# Patient Record
Sex: MALE | Race: WHITE | Employment: OTHER | URBAN - METROPOLITAN AREA
[De-identification: names, ages, dates, MRNs, and addresses within clinical notes are randomized per-mention and may not be internally consistent; named-entity substitution may affect disease eponyms.]

---

## 2021-02-01 ENCOUNTER — APPOINTMENT (OUTPATIENT)
Dept: GENERAL RADIOLOGY | Age: 76
DRG: 177 | End: 2021-02-01
Attending: EMERGENCY MEDICINE
Payer: MEDICARE

## 2021-02-01 ENCOUNTER — HOSPITAL ENCOUNTER (INPATIENT)
Age: 76
LOS: 6 days | Discharge: HOME | DRG: 177 | End: 2021-02-07
Attending: STUDENT IN AN ORGANIZED HEALTH CARE EDUCATION/TRAINING PROGRAM | Admitting: FAMILY MEDICINE
Payer: MEDICARE

## 2021-02-01 DIAGNOSIS — U07.1 COVID-19 VIRUS INFECTION: Primary | ICD-10-CM

## 2021-02-01 DIAGNOSIS — R09.02 HYPOXIA: ICD-10-CM

## 2021-02-01 PROBLEM — J96.01 ACUTE RESPIRATORY FAILURE WITH HYPOXIA (HCC): Status: ACTIVE | Noted: 2021-02-01

## 2021-02-01 PROBLEM — J18.9 PNEUMONIA: Status: ACTIVE | Noted: 2021-02-01

## 2021-02-01 LAB
ALBUMIN SERPL-MCNC: 3 G/DL (ref 3.5–5)
ALBUMIN/GLOB SERPL: 0.6 {RATIO} (ref 1.1–2.2)
ALP SERPL-CCNC: 47 U/L (ref 45–117)
ALT SERPL-CCNC: 52 U/L (ref 12–78)
ANION GAP SERPL CALC-SCNC: 8 MMOL/L (ref 5–15)
AST SERPL W P-5'-P-CCNC: 57 U/L (ref 15–37)
BASOPHILS # BLD: 0 K/UL (ref 0–0.1)
BASOPHILS NFR BLD: 0 % (ref 0–1)
BILIRUB SERPL-MCNC: 0.4 MG/DL (ref 0.2–1)
BUN SERPL-MCNC: 19 MG/DL (ref 6–20)
BUN/CREAT SERPL: 17 (ref 12–20)
CA-I BLD-MCNC: 8.9 MG/DL (ref 8.5–10.1)
CHLORIDE SERPL-SCNC: 99 MMOL/L (ref 97–108)
CO2 SERPL-SCNC: 26 MMOL/L (ref 21–32)
CREAT SERPL-MCNC: 1.14 MG/DL (ref 0.7–1.3)
DIFFERENTIAL METHOD BLD: ABNORMAL
EOSINOPHIL # BLD: 0 K/UL (ref 0–0.4)
EOSINOPHIL NFR BLD: 0 % (ref 0–7)
ERYTHROCYTE [DISTWIDTH] IN BLOOD BY AUTOMATED COUNT: 13.8 % (ref 11.5–14.5)
GLOBULIN SER CALC-MCNC: 4.7 G/DL (ref 2–4)
GLUCOSE SERPL-MCNC: 109 MG/DL (ref 65–100)
HCT VFR BLD AUTO: 46.3 % (ref 36.6–50.3)
HGB BLD-MCNC: 15.6 G/DL (ref 12.1–17)
IMM GRANULOCYTES # BLD AUTO: 0 K/UL (ref 0–0.04)
IMM GRANULOCYTES NFR BLD AUTO: 1 % (ref 0–0.5)
LACTATE SERPL-SCNC: 1.4 MMOL/L (ref 0.4–2)
LYMPHOCYTES # BLD: 1.2 K/UL (ref 0.8–3.5)
LYMPHOCYTES NFR BLD: 20 % (ref 12–49)
MCH RBC QN AUTO: 30.8 PG (ref 26–34)
MCHC RBC AUTO-ENTMCNC: 33.7 G/DL (ref 30–36.5)
MCV RBC AUTO: 91.5 FL (ref 80–99)
MONOCYTES # BLD: 0.6 K/UL (ref 0–1)
MONOCYTES NFR BLD: 10 % (ref 5–13)
NEUTS SEG # BLD: 4.5 K/UL (ref 1.8–8)
NEUTS SEG NFR BLD: 69 % (ref 32–75)
NRBC # BLD: 0 K/UL (ref 0–0.01)
NRBC BLD-RTO: 0 PER 100 WBC
PLATELET # BLD AUTO: 252 K/UL (ref 150–400)
PMV BLD AUTO: 9.2 FL (ref 8.9–12.9)
POTASSIUM SERPL-SCNC: 4.1 MMOL/L (ref 3.5–5.1)
PROT SERPL-MCNC: 7.7 G/DL (ref 6.4–8.2)
RBC # BLD AUTO: 5.06 M/UL (ref 4.1–5.7)
SODIUM SERPL-SCNC: 133 MMOL/L (ref 136–145)
WBC # BLD AUTO: 6.3 K/UL (ref 4.1–11.1)

## 2021-02-01 PROCEDURE — 99285 EMERGENCY DEPT VISIT HI MDM: CPT

## 2021-02-01 PROCEDURE — 85025 COMPLETE CBC W/AUTO DIFF WBC: CPT

## 2021-02-01 PROCEDURE — 80053 COMPREHEN METABOLIC PANEL: CPT

## 2021-02-01 PROCEDURE — 83605 ASSAY OF LACTIC ACID: CPT

## 2021-02-01 PROCEDURE — 71045 X-RAY EXAM CHEST 1 VIEW: CPT

## 2021-02-01 PROCEDURE — 36415 COLL VENOUS BLD VENIPUNCTURE: CPT

## 2021-02-01 PROCEDURE — 87040 BLOOD CULTURE FOR BACTERIA: CPT

## 2021-02-01 PROCEDURE — 93005 ELECTROCARDIOGRAM TRACING: CPT

## 2021-02-01 PROCEDURE — 65660000001 HC RM ICU INTERMED STEPDOWN

## 2021-02-01 RX ORDER — DEXAMETHASONE SODIUM PHOSPHATE 4 MG/ML
6 INJECTION, SOLUTION INTRA-ARTICULAR; INTRALESIONAL; INTRAMUSCULAR; INTRAVENOUS; SOFT TISSUE EVERY 8 HOURS
Status: DISCONTINUED | OUTPATIENT
Start: 2021-02-01 | End: 2021-02-03

## 2021-02-01 RX ORDER — POLYETHYLENE GLYCOL 3350 17 G/17G
17 POWDER, FOR SOLUTION ORAL DAILY PRN
Status: DISCONTINUED | OUTPATIENT
Start: 2021-02-01 | End: 2021-02-07 | Stop reason: HOSPADM

## 2021-02-01 RX ORDER — ENOXAPARIN SODIUM 100 MG/ML
40 INJECTION SUBCUTANEOUS DAILY
Status: DISCONTINUED | OUTPATIENT
Start: 2021-02-02 | End: 2021-02-03

## 2021-02-01 RX ORDER — PROMETHAZINE HYDROCHLORIDE 25 MG/1
12.5 TABLET ORAL
Status: DISCONTINUED | OUTPATIENT
Start: 2021-02-01 | End: 2021-02-07 | Stop reason: HOSPADM

## 2021-02-01 RX ORDER — ACETAMINOPHEN 650 MG/1
650 SUPPOSITORY RECTAL
Status: DISCONTINUED | OUTPATIENT
Start: 2021-02-01 | End: 2021-02-07 | Stop reason: HOSPADM

## 2021-02-01 RX ORDER — SODIUM CHLORIDE 0.9 % (FLUSH) 0.9 %
5-40 SYRINGE (ML) INJECTION EVERY 8 HOURS
Status: DISCONTINUED | OUTPATIENT
Start: 2021-02-01 | End: 2021-02-07 | Stop reason: HOSPADM

## 2021-02-01 RX ORDER — ACETAMINOPHEN 325 MG/1
650 TABLET ORAL
Status: DISCONTINUED | OUTPATIENT
Start: 2021-02-01 | End: 2021-02-07 | Stop reason: HOSPADM

## 2021-02-01 RX ORDER — SODIUM CHLORIDE 0.9 % (FLUSH) 0.9 %
5-40 SYRINGE (ML) INJECTION AS NEEDED
Status: DISCONTINUED | OUTPATIENT
Start: 2021-02-01 | End: 2021-02-07 | Stop reason: HOSPADM

## 2021-02-01 RX ORDER — ONDANSETRON 2 MG/ML
4 INJECTION INTRAMUSCULAR; INTRAVENOUS
Status: DISCONTINUED | OUTPATIENT
Start: 2021-02-01 | End: 2021-02-07 | Stop reason: HOSPADM

## 2021-02-01 NOTE — ED TRIAGE NOTES
[facility] pt tested COVID + a week ago and has been having increasing SOB/respiratory distress and confusion when his O2 saturation drops; upon EMS arrival pt's O2 sats were 74% on RA with 4l/min NC pt's sats peaked at 92%

## 2021-02-02 LAB
ANION GAP SERPL CALC-SCNC: 8 MMOL/L (ref 5–15)
ATRIAL RATE: 97 BPM
BACTERIA SPEC CULT: NORMAL
BACTERIA SPEC CULT: NORMAL
BUN SERPL-MCNC: 19 MG/DL (ref 6–20)
BUN/CREAT SERPL: 17 (ref 12–20)
CA-I BLD-MCNC: 8.5 MG/DL (ref 8.5–10.1)
CALCULATED P AXIS, ECG09: 43 DEGREES
CALCULATED R AXIS, ECG10: 25 DEGREES
CALCULATED T AXIS, ECG11: 33 DEGREES
CHLORIDE SERPL-SCNC: 101 MMOL/L (ref 97–108)
CO2 SERPL-SCNC: 25 MMOL/L (ref 21–32)
COVID-19 RAPID TEST, COVR: DETECTED
CREAT SERPL-MCNC: 1.1 MG/DL (ref 0.7–1.3)
CRP SERPL-MCNC: 10.5 MG/DL (ref 0–0.6)
DIAGNOSIS, 93000: NORMAL
ERYTHROCYTE [DISTWIDTH] IN BLOOD BY AUTOMATED COUNT: 13.8 % (ref 11.5–14.5)
FERRITIN SERPL-MCNC: 735 NG/ML (ref 26–388)
GLUCOSE SERPL-MCNC: 139 MG/DL (ref 65–100)
HCT VFR BLD AUTO: 42.8 % (ref 36.6–50.3)
HGB BLD-MCNC: 14.4 G/DL (ref 12.1–17)
LDH SERPL L TO P-CCNC: 434 U/L (ref 85–241)
MAGNESIUM SERPL-MCNC: 2 MG/DL (ref 1.6–2.4)
MCH RBC QN AUTO: 31 PG (ref 26–34)
MCHC RBC AUTO-ENTMCNC: 33.6 G/DL (ref 30–36.5)
MCV RBC AUTO: 92 FL (ref 80–99)
P-R INTERVAL, ECG05: 158 MS
PLATELET # BLD AUTO: 259 K/UL (ref 150–400)
PMV BLD AUTO: 9.7 FL (ref 8.9–12.9)
POTASSIUM SERPL-SCNC: 4.2 MMOL/L (ref 3.5–5.1)
PROCALCITONIN SERPL-MCNC: <0.05 NG/ML
Q-T INTERVAL, ECG07: 356 MS
QRS DURATION, ECG06: 90 MS
QTC CALCULATION (BEZET), ECG08: 452 MS
RBC # BLD AUTO: 4.65 M/UL (ref 4.1–5.7)
SARS-COV-2, COV2: NORMAL
SODIUM SERPL-SCNC: 134 MMOL/L (ref 136–145)
SPECIAL REQUESTS,SREQ: NORMAL
SPECIMEN SOURCE: ABNORMAL
VENTRICULAR RATE, ECG03: 97 BPM
WBC # BLD AUTO: 7 K/UL (ref 4.1–11.1)

## 2021-02-02 PROCEDURE — 85027 COMPLETE CBC AUTOMATED: CPT

## 2021-02-02 PROCEDURE — 83615 LACTATE (LD) (LDH) ENZYME: CPT

## 2021-02-02 PROCEDURE — 65660000001 HC RM ICU INTERMED STEPDOWN

## 2021-02-02 PROCEDURE — 74011250636 HC RX REV CODE- 250/636: Performed by: FAMILY MEDICINE

## 2021-02-02 PROCEDURE — 74011000258 HC RX REV CODE- 258: Performed by: FAMILY MEDICINE

## 2021-02-02 PROCEDURE — 83735 ASSAY OF MAGNESIUM: CPT

## 2021-02-02 PROCEDURE — XW033E5 INTRODUCTION OF REMDESIVIR ANTI-INFECTIVE INTO PERIPHERAL VEIN, PERCUTANEOUS APPROACH, NEW TECHNOLOGY GROUP 5: ICD-10-PCS | Performed by: HOSPITALIST

## 2021-02-02 PROCEDURE — 82728 ASSAY OF FERRITIN: CPT

## 2021-02-02 PROCEDURE — 86140 C-REACTIVE PROTEIN: CPT

## 2021-02-02 PROCEDURE — 74011000250 HC RX REV CODE- 250: Performed by: FAMILY MEDICINE

## 2021-02-02 PROCEDURE — 36415 COLL VENOUS BLD VENIPUNCTURE: CPT

## 2021-02-02 PROCEDURE — 84145 PROCALCITONIN (PCT): CPT

## 2021-02-02 PROCEDURE — 87635 SARS-COV-2 COVID-19 AMP PRB: CPT

## 2021-02-02 PROCEDURE — 80048 BASIC METABOLIC PNL TOTAL CA: CPT

## 2021-02-02 RX ORDER — ASCORBIC ACID 500 MG
1000 TABLET ORAL DAILY
Status: DISCONTINUED | OUTPATIENT
Start: 2021-02-03 | End: 2021-02-07 | Stop reason: HOSPADM

## 2021-02-02 RX ORDER — METOPROLOL TARTRATE 25 MG/1
25 TABLET, FILM COATED ORAL 2 TIMES DAILY
COMMUNITY

## 2021-02-02 RX ORDER — IRBESARTAN 300 MG/1
300 TABLET ORAL DAILY
COMMUNITY

## 2021-02-02 RX ORDER — ACYCLOVIR 400 MG/1
400 TABLET ORAL 2 TIMES DAILY
COMMUNITY
End: 2021-02-07

## 2021-02-02 RX ORDER — ZINC SULFATE 50(220)MG
1 CAPSULE ORAL DAILY
Status: DISCONTINUED | OUTPATIENT
Start: 2021-02-03 | End: 2021-02-07 | Stop reason: HOSPADM

## 2021-02-02 RX ORDER — AMLODIPINE BESYLATE 5 MG/1
5 TABLET ORAL DAILY
COMMUNITY

## 2021-02-02 RX ORDER — GUAIFENESIN 100 MG/5ML
81 LIQUID (ML) ORAL DAILY
COMMUNITY

## 2021-02-02 RX ORDER — MELATONIN
2000 DAILY
Status: DISCONTINUED | OUTPATIENT
Start: 2021-02-03 | End: 2021-02-07 | Stop reason: HOSPADM

## 2021-02-02 RX ORDER — GUAIFENESIN 100 MG/5ML
81 LIQUID (ML) ORAL DAILY
Status: DISCONTINUED | OUTPATIENT
Start: 2021-02-03 | End: 2021-02-07 | Stop reason: HOSPADM

## 2021-02-02 RX ADMIN — DEXAMETHASONE SODIUM PHOSPHATE 6 MG: 4 INJECTION, SOLUTION INTRA-ARTICULAR; INTRALESIONAL; INTRAMUSCULAR; INTRAVENOUS; SOFT TISSUE at 06:41

## 2021-02-02 RX ADMIN — Medication 10 ML: at 17:26

## 2021-02-02 RX ADMIN — Medication 10 ML: at 06:01

## 2021-02-02 RX ADMIN — REMDESIVIR 200 MG: 100 INJECTION, POWDER, LYOPHILIZED, FOR SOLUTION INTRAVENOUS at 00:59

## 2021-02-02 RX ADMIN — Medication 10 ML: at 00:02

## 2021-02-02 RX ADMIN — ENOXAPARIN SODIUM 40 MG: 40 INJECTION SUBCUTANEOUS at 09:00

## 2021-02-02 RX ADMIN — DEXAMETHASONE SODIUM PHOSPHATE 6 MG: 4 INJECTION, SOLUTION INTRA-ARTICULAR; INTRALESIONAL; INTRAMUSCULAR; INTRAVENOUS; SOFT TISSUE at 00:03

## 2021-02-02 RX ADMIN — AZITHROMYCIN DIHYDRATE 500 MG: 500 INJECTION, POWDER, LYOPHILIZED, FOR SOLUTION INTRAVENOUS at 22:56

## 2021-02-02 RX ADMIN — AZITHROMYCIN DIHYDRATE 500 MG: 500 INJECTION, POWDER, LYOPHILIZED, FOR SOLUTION INTRAVENOUS at 00:04

## 2021-02-02 RX ADMIN — DEXAMETHASONE SODIUM PHOSPHATE 6 MG: 4 INJECTION, SOLUTION INTRA-ARTICULAR; INTRALESIONAL; INTRAMUSCULAR; INTRAVENOUS; SOFT TISSUE at 17:26

## 2021-02-02 RX ADMIN — REMDESIVIR 100 MG: 100 INJECTION, POWDER, LYOPHILIZED, FOR SOLUTION INTRAVENOUS at 21:52

## 2021-02-02 RX ADMIN — DEXAMETHASONE SODIUM PHOSPHATE 6 MG: 4 INJECTION, SOLUTION INTRA-ARTICULAR; INTRALESIONAL; INTRAMUSCULAR; INTRAVENOUS; SOFT TISSUE at 21:46

## 2021-02-02 NOTE — PROGRESS NOTES
Hospitalist Progress Note               Daily Progress Note: 2/2/2021      Subjective: The patient is seen for follow up. He is a 73-year-old male with a history of hypertension and CAD who was admitted last night with shortness of breath, cough, confusion and fatigue. He was found to be positive for COVID-19 3 days ago. He denies fever. On arrival of EMS he had oxygen saturations of 74% on room air. Chest x-ray shows multi focal patchy airspace disease.   Labs showed a CRP of 10.5, negative procalcitonin,  normal WBC    Remdesivir has been started along with Decadron and azithromycin    Patient is presently on a 100% nonrebreather but feels like it is suffocating him    Problem List:  Problem List as of 2/2/2021 Date Reviewed: 2/1/2021          Codes Class Noted - Resolved    Pneumonia ICD-10-CM: J18.9  ICD-9-CM: 486  2/1/2021 - Present        Acute respiratory failure with hypoxia [facility]) ICD-10-CM: J96.01  ICD-9-CM: 518.81  2/1/2021 - Present        COVID-19 ICD-10-CM: U07.1  ICD-9-CM: 079.89  2/1/2021 - Present              Medications reviewed  Current Facility-Administered Medications   Medication Dose Route Frequency    azithromycin (ZITHROMAX) 500 mg in 0.9% sodium chloride 250 mL (VIAL-MATE)  500 mg IntraVENous Q24H    dexamethasone (DECADRON) 4 mg/mL injection 6 mg  6 mg IntraVENous Q8H    sodium chloride (NS) flush 5-40 mL  5-40 mL IntraVENous Q8H    sodium chloride (NS) flush 5-40 mL  5-40 mL IntraVENous PRN    acetaminophen (TYLENOL) tablet 650 mg  650 mg Oral Q6H PRN    Or    acetaminophen (TYLENOL) suppository 650 mg  650 mg Rectal Q6H PRN    polyethylene glycol (MIRALAX) packet 17 g  17 g Oral DAILY PRN    promethazine (PHENERGAN) tablet 12.5 mg  12.5 mg Oral Q6H PRN    Or    ondansetron (ZOFRAN) injection 4 mg  4 mg IntraVENous Q6H PRN    enoxaparin (LOVENOX) injection 40 mg  40 mg SubCUTAneous DAILY    remdesivir 100 mg in 0.9% sodium chloride 250 mL IVPB  100 mg IntraVENous Q24H     Current Outpatient Medications   Medication Sig    irbesartan (AVAPRO) 300 mg tablet Take 300 mg by mouth daily.  acyclovir (ZOVIRAX) 400 mg tablet Take 400 mg by mouth two (2) times a day.  aspirin 81 mg chewable tablet Take 81 mg by mouth daily.  metoprolol tartrate (LOPRESSOR) 25 mg tablet Take 25 mg by mouth two (2) times a day.  amLODIPine (Norvasc) 5 mg tablet Take 5 mg by mouth daily. Review of Systems:   A comprehensive review of systems was negative except for that written in the HPI. Objective:   Physical Exam:     Visit Vitals  BP 96/64   Pulse 73   Temp 98.7 °F (37.1 °C)   Resp 18   Ht 5' 9\" (1.753 m)   Wt 99.8 kg (220 lb)   SpO2 99%   BMI 32.49 kg/m²    O2 Flow Rate (L/min): 15 l/min O2 Device: Non-rebreather mask    Temp (24hrs), Av.7 °F (37.1 °C), Min:98.7 °F (37.1 °C), Max:98.7 °F (37.1 °C)    No intake/output data recorded. No intake/output data recorded. General:   Awake and alert   Lungs:   Clear to auscultation bilaterally. Chest wall:  No tenderness or deformity. Heart:  Regular rate and rhythm, S1, S2 normal, no murmur, click, rub or gallop. Abdomen:   Soft, non-tender. Bowel sounds normal. No masses,  No organomegaly. Extremities: Extremities normal, atraumatic, no cyanosis or edema. Pulses: 2+ and symmetric all extremities. Skin: Skin color, texture, turgor normal. No rashes or lesions   Neurologic: CNII-XII intact. No gross focal deficits         Data Review:       Recent Days:  Recent Labs     21   WBC 7.0 6.3   HGB 14.4 15.6   HCT 42.8 46.3    252     Recent Labs     21   * 133*   K 4.2 4.1    99   CO2 25 26   * 109*   BUN 19 19   CREA 1.10 1.14   CA 8.5 8.9   MG 2.0  --    ALB  --  3.0*   TBILI  --  0.4   ALT  --  52     No results for input(s): PH, PCO2, PO2, HCO3, FIO2 in the last 72 hours.     24 Hour Results:  Recent Results (from the past 24 hour(s))   CBC WITH AUTOMATED DIFF    Collection Time: 02/01/21  7:50 PM   Result Value Ref Range    WBC 6.3 4.1 - 11.1 K/uL    RBC 5.06 4.10 - 5.70 M/uL    HGB 15.6 12.1 - 17.0 g/dL    HCT 46.3 36.6 - 50.3 %    MCV 91.5 80.0 - 99.0 FL    MCH 30.8 26.0 - 34.0 PG    MCHC 33.7 30.0 - 36.5 g/dL    RDW 13.8 11.5 - 14.5 %    PLATELET 265 561 - 354 K/uL    MPV 9.2 8.9 - 12.9 FL    NRBC 0.0 0  WBC    ABSOLUTE NRBC 0.00 0.00 - 0.01 K/uL    NEUTROPHILS 69 32 - 75 %    LYMPHOCYTES 20 12 - 49 %    MONOCYTES 10 5 - 13 %    EOSINOPHILS 0 0 - 7 %    BASOPHILS 0 0 - 1 %    IMMATURE GRANULOCYTES 1 (H) 0.0 - 0.5 %    ABS. NEUTROPHILS 4.5 1.8 - 8.0 K/UL    ABS. LYMPHOCYTES 1.2 0.8 - 3.5 K/UL    ABS. MONOCYTES 0.6 0.0 - 1.0 K/UL    ABS. EOSINOPHILS 0.0 0.0 - 0.4 K/UL    ABS. BASOPHILS 0.0 0.0 - 0.1 K/UL    ABS. IMM. GRANS. 0.0 0.00 - 0.04 K/UL    DF AUTOMATED     METABOLIC PANEL, COMPREHENSIVE    Collection Time: 02/01/21  7:50 PM   Result Value Ref Range    Sodium 133 (L) 136 - 145 mmol/L    Potassium 4.1 3.5 - 5.1 mmol/L    Chloride 99 97 - 108 mmol/L    CO2 26 21 - 32 mmol/L    Anion gap 8 5 - 15 mmol/L    Glucose 109 (H) 65 - 100 mg/dL    BUN 19 6 - 20 mg/dL    Creatinine 1.14 0.70 - 1.30 mg/dL    BUN/Creatinine ratio 17 12 - 20      GFR est AA >60 >60 ml/min/1.73m2    GFR est non-AA >60 >60 ml/min/1.73m2    Calcium 8.9 8.5 - 10.1 mg/dL    Bilirubin, total 0.4 0.2 - 1.0 mg/dL    AST (SGOT) 57 (H) 15 - 37 U/L    ALT (SGPT) 52 12 - 78 U/L    Alk.  phosphatase 47 45 - 117 U/L    Protein, total 7.7 6.4 - 8.2 g/dL    Albumin 3.0 (L) 3.5 - 5.0 g/dL    Globulin 4.7 (H) 2.0 - 4.0 g/dL    A-G Ratio 0.6 (L) 1.1 - 2.2     LACTIC ACID    Collection Time: 02/01/21  7:50 PM   Result Value Ref Range    Lactic acid 1.4 0.4 - 2.0 mmol/L   CULTURE, BLOOD, PAIRED    Collection Time: 02/01/21  7:50 PM    Specimen: Blood   Result Value Ref Range    Special Requests: No Special Requests      Culture result:        Gram Positive Rods  CALLED TO AND READ BACK BY  ONESIMO MORALES AT [id number] BY JETHRO  Single bottle drawn has been flagged positive by instumentation. Bottle has been sent to [address] [place] [address] Laboratory for determination of possible growth.      EKG, 12 LEAD, INITIAL    Collection Time: 02/01/21  8:10 PM   Result Value Ref Range    Ventricular Rate 97 BPM    Atrial Rate 97 BPM    P-R Interval 158 ms    QRS Duration 90 ms    Q-T Interval 356 ms    QTC Calculation (Bezet) 452 ms    Calculated P Axis 43 degrees    Calculated R Axis 25 degrees    Calculated T Axis 33 degrees    Diagnosis       Normal sinus rhythm  Normal ECG  No previous ECGs available  Confirmed by Alonso MACEDO MD ([id number]) on 2/2/2021 37:83:20 AM     METABOLIC PANEL, BASIC    Collection Time: 02/02/21  3:30 AM   Result Value Ref Range    Sodium 134 (L) 136 - 145 mmol/L    Potassium 4.2 3.5 - 5.1 mmol/L    Chloride 101 97 - 108 mmol/L    CO2 25 21 - 32 mmol/L    Anion gap 8 5 - 15 mmol/L    Glucose 139 (H) 65 - 100 mg/dL    BUN 19 6 - 20 mg/dL    Creatinine 1.10 0.70 - 1.30 mg/dL    BUN/Creatinine ratio 17 12 - 20      GFR est AA >60 >60 ml/min/1.73m2    GFR est non-AA >60 >60 ml/min/1.73m2    Calcium 8.5 8.5 - 10.1 mg/dL   MAGNESIUM    Collection Time: 02/02/21  3:30 AM   Result Value Ref Range    Magnesium 2.0 1.6 - 2.4 mg/dL   CBC W/O DIFF    Collection Time: 02/02/21  3:30 AM   Result Value Ref Range    WBC 7.0 4.1 - 11.1 K/uL    RBC 4.65 4.10 - 5.70 M/uL    HGB 14.4 12.1 - 17.0 g/dL    HCT 42.8 36.6 - 50.3 %    MCV 92.0 80.0 - 99.0 FL    MCH 31.0 26.0 - 34.0 PG    MCHC 33.6 30.0 - 36.5 g/dL    RDW 13.8 11.5 - 14.5 %    PLATELET 139 510 - 975 K/uL    MPV 9.7 8.9 - 12.9 FL   PROCALCITONIN    Collection Time: 02/02/21 11:10 AM   Result Value Ref Range    Procalcitonin <0.05 (H) 0 ng/mL   LD    Collection Time: 02/02/21 11:10 AM   Result Value Ref Range     (H) 85 - 241 U/L   C REACTIVE PROTEIN, QT    Collection Time: 02/02/21 11:10 AM   Result Value Ref Range    C-Reactive protein 10.50 (H) 0.00 - 0.60 mg/dL       XR CHEST SNGL V   Final Result   Findings/impression:      Multifocal patchy interstitial airspace disease. No pleural effusion or   pneumothorax. Cardiomediastinal contours are within normal limits. No acute osseous abnormality identified. Assessment:  COVID-19 pneumonitis    Acute respiratory failure with hypoxia    Hypertension. Blood pressure on low side, antihypertensives on hold    Coronary artery disease      Plan:  Continue remdesivir, Decadron, azithromycin  I added vitamin D, vitamin C and zinc  Recheck labs in a.m. Patient's blood pressure medications are on hold  We will try high flow nasal oxygen    Full CODE STATUS  His son Patricia Lweis is surrogate decision-maker      Care Plan discussed with: Patient/Family    Total time spent with patient: 30 minutes.     Anabella Chatman MD

## 2021-02-02 NOTE — H&P
History and Physical    Patient: Jorge Kaufman MRN: [id number]  SSN: [id number]    YOB: 1945  Age: 77 y.o. Sex: male      Subjective:      Chief Complaint: Shortness of breath    HPI: Jorge Kaufman is a 77 y.o. male with past medical history of hypertension and coronary artery disease presenting to the ER with complaints of shortness of breath, cough, confusion, fatigue and generalized weakness. Shortness of breath and cough have been present for the past 2 weeks. Patient was found to be positive for COVID-19 3 days ago. Mr. Eligio Jorgensen denies recent fever, chills, nausea, vomiting, chest pain, palpitations, diarrhea or abdominal pain. This evening, emergency medical services were called for increased work of breathing, confusion and worsening shortness of breath. On arrival to patient's home, EMS found patient to have oxygen saturations of 74% on room air. Mr. Eligio Jorgensen was placed on a NRB and transported to the ER for further treatment and evaluation. Past medical history, past surgical history, family history, social history and home medication list was reviewed at the time of admission. Patient is a full code. Point of contact is son, Shannan White, who can be reached at [phone]. Past Medical History:   Diagnosis Date    Bladder cancer [facility])     s/p tumor resection    CAD (coronary artery disease)     previous stent placement    HSV infection     Hypertension      Past Surgical History:   Procedure Laterality Date    HX UROLOGICAL      groin surgery after W      Family History   Problem Relation Age of Onset    Hypertension Father     Coronary Artery Disease Other      Social History     Tobacco Use    Smoking status: Former Smoker    Smokeless tobacco: Never Used   Substance Use Topics    Alcohol use: Not Currently      Prior to Admission medications    Medication Sig Start Date End Date Taking?  Authorizing Provider   irbesartan (AVAPRO) 300 mg tablet Take 300 mg by mouth daily. Yes Provider, Historical   acyclovir (ZOVIRAX) 400 mg tablet Take 400 mg by mouth two (2) times a day. Yes Provider, Historical   aspirin 81 mg chewable tablet Take 81 mg by mouth daily. Yes Provider, Historical   metoprolol tartrate (LOPRESSOR) 25 mg tablet Take 25 mg by mouth two (2) times a day. Yes Provider, Historical   amLODIPine (Norvasc) 5 mg tablet Take 5 mg by mouth daily. Yes Provider, Historical        No Known Allergies    Review of Systems:  Constitutional: Positive for generalized weakness, fatigue, weakness, confusion. Denies fevers, chills, unexplained weight loss, night sweats. Head, Eyes, Ears, Nose, Mouth, Throat: Denies nasal congestion, sore throat, rhinorrhea, earache, ringing of the ears, difficulty hearing, facial pain, facial swelling. Respiratory: Positive for shortness of breath, cough. Denies wheezing, sputum production, hemoptysis. Denies use of oxygen at home. Cardiovascular: Denies chest pain, irregular heart beat, racing pulse, lower extremity edema, dizziness, dyspnea on exertion, orthopnea. Gastrointestinal: Denies nausea, vomiting, diarrhea, constipation, abdominal pain, loss of appetite, acid reflux, melena, hematochezia, change in bowel habits. Endocrine: Denies intolerance to heat or cold. Denies polyuria, polydipsia, polyphagia. Denies recent weight changes. Genitourinary: Denies increased urinary frequency, dysuria, hematuria, urinary incontinence, increased urinary frequency. Integument/Breast: Denies rash, itching or new skin lesions. Musculoskeletal: Denies joint swelling, joint pain, myalgias, neck pain, back pain. Neurological: Denies headaches, dizziness, confusion, tremors, numbness/tingling, paresthesias, weakness, problems with balance, loss of consciousness. Hematologic: Denies easy bleeding, easy bruising, lymphadenopathy.   Behavioral/Psychiatric: Denies anxiety, depression, increased irritability, mood swings, delusions, hallucination, SI/HI. Objective:     Vitals:    02/01/21 1823 02/01/21 2019   BP: 109/73 103/73   Pulse: (!) 116 (!) 103   Resp: 23 (!) 34   Temp: 98.7 °F (37.1 °C)    SpO2:  91%   Weight: 99.8 kg (220 lb)    Height: 5' 9\" (1.753 m)         Physical Exam:  General: Alert and Oriented x 3. Appears unwell but not toxic. Cooperative. No acute distress. Nourished and well developed. Head/Eyes: Normocephalic, atraumatic, EOMI, PERRLA   Nose/Mouth: Turbinates within normal limits, No drainage. Mucous membranes are moist.  Throat and Neck: Posterior pharynx without erythema or exudate. No masses, JVD, thyromegaly or lymphadenopathy appreciated. Cervical spine has good range of motion without pain. Lungs: Diffuse bilaterally rhonchi with decreased breath sounds. Symmetric chest rise with respirations. Heart: Regular rate and rhythm. Normal S1/S2. No appreciated murmurs, rubs or gallops. No lower extremity edema. Abdomen: Soft, non-tender, non-distended. Bowel sounds present in all four quadrants. No masses appreciated. Extremities:  Atraumatic. Able to move all extremities symmetrically. No abnormal bony protuberances appreciated. Back: No pain with palpation over spinous processes or paraspinal musculature. No CVA tenderness. Skin: Clean, dry and intact without appreciated lesions. Neurologic: A&Ox3. Cranial nerves 2-12 are grossly intact. Intact sensation and motor strength in all 4 extremities. No focal deficits. Psychiatric: Normal affect, normal thought process, good eye contact.      Recent Results (from the past 24 hour(s))   CBC WITH AUTOMATED DIFF    Collection Time: 02/01/21  7:50 PM   Result Value Ref Range    WBC 6.3 4.1 - 11.1 K/uL    RBC 5.06 4.10 - 5.70 M/uL    HGB 15.6 12.1 - 17.0 g/dL    HCT 46.3 36.6 - 50.3 %    MCV 91.5 80.0 - 99.0 FL    MCH 30.8 26.0 - 34.0 PG    MCHC 33.7 30.0 - 36.5 g/dL    RDW 13.8 11.5 - 14.5 %    PLATELET 016 591 - 438 K/uL    MPV 9.2 8.9 - 12.9 FL    NRBC 0.0 0  WBC ABSOLUTE NRBC 0.00 0.00 - 0.01 K/uL    NEUTROPHILS 69 32 - 75 %    LYMPHOCYTES 20 12 - 49 %    MONOCYTES 10 5 - 13 %    EOSINOPHILS 0 0 - 7 %    BASOPHILS 0 0 - 1 %    IMMATURE GRANULOCYTES 1 (H) 0.0 - 0.5 %    ABS. NEUTROPHILS 4.5 1.8 - 8.0 K/UL    ABS. LYMPHOCYTES 1.2 0.8 - 3.5 K/UL    ABS. MONOCYTES 0.6 0.0 - 1.0 K/UL    ABS. EOSINOPHILS 0.0 0.0 - 0.4 K/UL    ABS. BASOPHILS 0.0 0.0 - 0.1 K/UL    ABS. IMM. GRANS. 0.0 0.00 - 0.04 K/UL    DF AUTOMATED     METABOLIC PANEL, COMPREHENSIVE    Collection Time: 02/01/21  7:50 PM   Result Value Ref Range    Sodium 133 (L) 136 - 145 mmol/L    Potassium 4.1 3.5 - 5.1 mmol/L    Chloride 99 97 - 108 mmol/L    CO2 26 21 - 32 mmol/L    Anion gap 8 5 - 15 mmol/L    Glucose 109 (H) 65 - 100 mg/dL    BUN 19 6 - 20 mg/dL    Creatinine 1.14 0.70 - 1.30 mg/dL    BUN/Creatinine ratio 17 12 - 20      GFR est AA >60 >60 ml/min/1.73m2    GFR est non-AA >60 >60 ml/min/1.73m2    Calcium 8.9 8.5 - 10.1 mg/dL    Bilirubin, total 0.4 0.2 - 1.0 mg/dL    AST (SGOT) 57 (H) 15 - 37 U/L    ALT (SGPT) 52 12 - 78 U/L    Alk. phosphatase 47 45 - 117 U/L    Protein, total 7.7 6.4 - 8.2 g/dL    Albumin 3.0 (L) 3.5 - 5.0 g/dL    Globulin 4.7 (H) 2.0 - 4.0 g/dL    A-G Ratio 0.6 (L) 1.1 - 2.2     LACTIC ACID    Collection Time: 02/01/21  7:50 PM   Result Value Ref Range    Lactic acid 1.4 0.4 - 2.0 mmol/L       XR Results (maximum last 3): Results from Hospital Encounter encounter on 02/01/21   XR CHEST SNGL V    Narrative Study: XR CHEST SNGL V    Clinical indication: COVID +    Comparison: None. Impression Findings/impression:    Multifocal patchy interstitial airspace disease. No pleural effusion or  pneumothorax. Cardiomediastinal contours are within normal limits. No acute osseous abnormality identified. Assessment:     Klever Graves is a 77 y.o. male who presents with shortness of breath, cough and confusion. EMS found patient hypoxic with oxygen saturation of 74% on room air. Admit for acute hypoxic respiratory failure secondary to COVID-19 infection and bilateral pneumonia. Plan:     1. Admit to telemetry bed. 2. Covid-19 results are positive. Order isolation precautions. Monitor blood cultures. Order sputum culture. Continue IV ceftriaxone and azithromycin. Order IV Decadron and Remdesivir. Check ferritin, LDH, CRP and procalcitonin levels. 3. Continue oxygen supplementation via nasal cannula for acute hypoxic respiratory failure. Order Q4H Albuterol SOB/Wheezing. 4. I have held home dose of Avapro, metoprolol and Norvasc. Blood pressure has been on the low side (MAP between 65-70) since arriving to the ER. GI PPX: Diet ordered. DVT PPX: Lovenox SQ.     Signed By: Steffi Haddad MD     February 2, 2021

## 2021-02-02 NOTE — ED PROVIDER NOTES
EMERGENCY DEPARTMENT HISTORY AND PHYSICAL EXAM      Date: 2/1/2021  Patient Name: Ema Eugene    History of Presenting Illness     Chief Complaint   Patient presents with    Respiratory Distress       History Provided By: Patient    HPI: Ema Eugene, 77 y.o. male with a past medical history significant hypertension presents to the ED with cc of shortness of breath, confusion. Patient states he was diagnosed with COVID-19 approximately 3 days ago, however he has been feeling short of breath over the last 2 weeks. Family did get tested last week and was resulted on Saturday. Patient states he has noticed worsening shortness of breath over the last several days, with some episodes of confusion as per EMS. EMS arrived and found patient to be saturating at 74% on room air, improved on nonrebreather mask to 95%. Patient denies any fevers chills, does describe some mild chest tightness, cough was productive of yellowish sputum, now describes a dry cough. no abdominal pain no nausea vomiting, no loose stool or diarrhea. There are no other complaints, changes, or physical findings at this time. PCP: Other, MD Yasir        Past History     Past Medical History:  No past medical history on file. Past Surgical History:  No past surgical history on file. Family History:  No family history on file. Social History:  Social History     Tobacco Use    Smoking status: Not on file   Substance Use Topics    Alcohol use: Not on file    Drug use: Not on file       Allergies:  No Known Allergies      Review of Systems     Review of Systems   Constitutional: Positive for chills and fever. Negative for activity change. HENT: Negative for congestion and sore throat. Eyes: Negative for photophobia and visual disturbance. Respiratory: Positive for cough. Negative for chest tightness and shortness of breath. Cardiovascular: Negative for chest pain.    Gastrointestinal: Negative for abdominal pain and vomiting. Genitourinary: Negative for dysuria and frequency. Musculoskeletal: Positive for myalgias. Negative for arthralgias, back pain, neck pain and neck stiffness. Skin: Negative for color change. Neurological: Negative for dizziness, light-headedness and headaches. Physical Exam     Physical Exam  Vitals signs and nursing note reviewed. Constitutional:       General: He is not in acute distress. Appearance: Normal appearance. He is ill-appearing. HENT:      Head: Normocephalic and atraumatic. Nose: Nose normal.      Mouth/Throat:      Mouth: Mucous membranes are moist.      Pharynx: Oropharynx is clear. Eyes:      Extraocular Movements: Extraocular movements intact. Pupils: Pupils are equal, round, and reactive to light. Neck:      Musculoskeletal: Normal range of motion and neck supple. No neck rigidity or muscular tenderness. Cardiovascular:      Rate and Rhythm: Tachycardia present. Pulses: Normal pulses. Pulmonary:      Effort: Tachypnea present. No respiratory distress. Breath sounds: No wheezing or rales. Chest:      Chest wall: No tenderness. Abdominal:      General: Abdomen is flat. Bowel sounds are normal.      Palpations: Abdomen is soft. Musculoskeletal: Normal range of motion. General: No swelling or tenderness. Skin:     General: Skin is warm and dry. Capillary Refill: Capillary refill takes less than 2 seconds. Neurological:      General: No focal deficit present. Mental Status: He is alert and oriented to person, place, and time. Cranial Nerves: No cranial nerve deficit. Sensory: No sensory deficit.    Psychiatric:         Mood and Affect: Mood normal.         Behavior: Behavior normal.         Diagnostic Study Results     Labs -     Recent Results (from the past 12 hour(s))   CBC WITH AUTOMATED DIFF    Collection Time: 02/01/21  7:50 PM   Result Value Ref Range    WBC 6.3 4.1 - 11.1 K/uL    RBC 5.06 4.10 - 5.70 M/uL    HGB 15.6 12.1 - 17.0 g/dL    HCT 46.3 36.6 - 50.3 %    MCV 91.5 80.0 - 99.0 FL    MCH 30.8 26.0 - 34.0 PG    MCHC 33.7 30.0 - 36.5 g/dL    RDW 13.8 11.5 - 14.5 %    PLATELET 667 785 - 349 K/uL    MPV 9.2 8.9 - 12.9 FL    NRBC 0.0 0  WBC    ABSOLUTE NRBC 0.00 0.00 - 0.01 K/uL    NEUTROPHILS 69 32 - 75 %    LYMPHOCYTES 20 12 - 49 %    MONOCYTES 10 5 - 13 %    EOSINOPHILS 0 0 - 7 %    BASOPHILS 0 0 - 1 %    IMMATURE GRANULOCYTES 1 (H) 0.0 - 0.5 %    ABS. NEUTROPHILS 4.5 1.8 - 8.0 K/UL    ABS. LYMPHOCYTES 1.2 0.8 - 3.5 K/UL    ABS. MONOCYTES 0.6 0.0 - 1.0 K/UL    ABS. EOSINOPHILS 0.0 0.0 - 0.4 K/UL    ABS. BASOPHILS 0.0 0.0 - 0.1 K/UL    ABS. IMM. GRANS. 0.0 0.00 - 0.04 K/UL    DF AUTOMATED     METABOLIC PANEL, COMPREHENSIVE    Collection Time: 02/01/21  7:50 PM   Result Value Ref Range    Sodium 133 (L) 136 - 145 mmol/L    Potassium 4.1 3.5 - 5.1 mmol/L    Chloride 99 97 - 108 mmol/L    CO2 26 21 - 32 mmol/L    Anion gap 8 5 - 15 mmol/L    Glucose 109 (H) 65 - 100 mg/dL    BUN 19 6 - 20 mg/dL    Creatinine 1.14 0.70 - 1.30 mg/dL    BUN/Creatinine ratio 17 12 - 20      GFR est AA >60 >60 ml/min/1.73m2    GFR est non-AA >60 >60 ml/min/1.73m2    Calcium 8.9 8.5 - 10.1 mg/dL    Bilirubin, total 0.4 0.2 - 1.0 mg/dL    AST (SGOT) 57 (H) 15 - 37 U/L    ALT (SGPT) 52 12 - 78 U/L    Alk. phosphatase 47 45 - 117 U/L    Protein, total 7.7 6.4 - 8.2 g/dL    Albumin 3.0 (L) 3.5 - 5.0 g/dL    Globulin 4.7 (H) 2.0 - 4.0 g/dL    A-G Ratio 0.6 (L) 1.1 - 2.2     LACTIC ACID    Collection Time: 02/01/21  7:50 PM   Result Value Ref Range    Lactic acid 1.4 0.4 - 2.0 mmol/L       Radiologic Studies -   [unfilled]  CT Results  (Last 48 hours)    None        CXR Results  (Last 48 hours)               02/01/21 1838  XR CHEST SNGL V Final result    Impression:  Findings/impression:       Multifocal patchy interstitial airspace disease. No pleural effusion or   pneumothorax.        Cardiomediastinal contours are within normal limits.       No acute osseous abnormality identified.       Narrative:  Study: XR CHEST SNGL V       Clinical indication: COVID +       Comparison: None.                 Medical Decision Making and ED Course   I am the first provider for this patient.    I reviewed the vital signs, available nursing notes, past medical history, past surgical history, family history and social history.    Vital Signs-Reviewed the patient's vital signs.  Patient Vitals for the past 12 hrs:   Temp Pulse Resp BP SpO2   02/01/21 2019 — (!) 103 (!) 34 103/73 91 %   02/01/21 1823 98.7 °F (37.1 °C) (!) 116 23 109/73 —       EKG interpretation: (Preliminary)  Normal sinus rhythm 97, no ST elevations, normal axis,      Records Reviewed: Nursing Notes    The patient presents with cough, shortness of breath with a differential diagnosis of pneumonia, pleural effusion, COVID-19 infection, bronchitis      Provider Notes (Medical Decision Making):     MDM   75-year-old male, past medical history pretension, recently diagnosed with COVID-19, presents to emergency department for shortness of breath.    Noted to be hypoxic at home at 74% on room air.  Patient placed on nonrebreather with saturations at 95%.  Patient awake alert, still tachypneic however in no distress.  Clear breath sounds bilateral    Basic lab work drawn, chest x-ray ordered, patient supported with oxygen therapy.        ED Course:   Initial assessment performed. The patients presenting problems have been discussed, and they are in agreement with the care plan formulated and outlined with them.  I have encouraged them to ask questions as they arise throughout their visit.    ED Course as of Feb 01 2204   Mon Feb 01, 2021   1855 XR CHEST SNGL V [PZ]   2203 Discussed case with Dr. Olivo, endorsed admission to hospital.    Discussed plan for admission with patient, is amenable to plan.        [PZ]      ED Course User Index  [PZ] Nathanael Crump MD         Procedures  Matthew Oh MD  Procedures               Disposition     Admit to hospital          Diagnosis     Clinical Impression:   1. COVID-19 virus infection    2. Hypoxia        Attestations:    Matthew Oh MD    Please note that this dictation was completed with [email or web address], the computer voice recognition software. Quite often unanticipated grammatical, syntax, homophones, and other interpretive errors are inadvertently transcribed by the computer software. Please disregard these errors. Please excuse any errors that have escaped final proofreading. Thank you.

## 2021-02-03 LAB
ANION GAP SERPL CALC-SCNC: 8 MMOL/L (ref 5–15)
BASOPHILS # BLD: 0 K/UL (ref 0–0.1)
BASOPHILS NFR BLD: 0 % (ref 0–1)
BUN SERPL-MCNC: 27 MG/DL (ref 6–20)
BUN/CREAT SERPL: 26 (ref 12–20)
CA-I BLD-MCNC: 8.7 MG/DL (ref 8.5–10.1)
CHLORIDE SERPL-SCNC: 104 MMOL/L (ref 97–108)
CO2 SERPL-SCNC: 23 MMOL/L (ref 21–32)
CREAT SERPL-MCNC: 1.04 MG/DL (ref 0.7–1.3)
CRP SERPL-MCNC: 5.58 MG/DL (ref 0–0.6)
DIFFERENTIAL METHOD BLD: ABNORMAL
EOSINOPHIL # BLD: 0 K/UL (ref 0–0.4)
EOSINOPHIL NFR BLD: 0 % (ref 0–7)
ERYTHROCYTE [DISTWIDTH] IN BLOOD BY AUTOMATED COUNT: 13.8 % (ref 11.5–14.5)
GLUCOSE BLD STRIP.AUTO-MCNC: 173 MG/DL (ref 65–100)
GLUCOSE BLD STRIP.AUTO-MCNC: 192 MG/DL (ref 65–100)
GLUCOSE SERPL-MCNC: 205 MG/DL (ref 65–100)
HCT VFR BLD AUTO: 44.3 % (ref 36.6–50.3)
HGB BLD-MCNC: 14.9 G/DL (ref 12.1–17)
IMM GRANULOCYTES # BLD AUTO: 0 K/UL
IMM GRANULOCYTES NFR BLD AUTO: 0 %
LDH SERPL L TO P-CCNC: 381 U/L (ref 85–241)
LYMPHOCYTES # BLD: 1.2 K/UL (ref 0.8–3.5)
LYMPHOCYTES NFR BLD: 8 % (ref 12–49)
MCH RBC QN AUTO: 30.7 PG (ref 26–34)
MCHC RBC AUTO-ENTMCNC: 33.6 G/DL (ref 30–36.5)
MCV RBC AUTO: 91.3 FL (ref 80–99)
MONOCYTES # BLD: 0.6 K/UL (ref 0–1)
MONOCYTES NFR BLD: 4 % (ref 5–13)
NEUTS SEG # BLD: 13.1 K/UL (ref 1.8–8)
NEUTS SEG NFR BLD: 88 % (ref 32–75)
NRBC # BLD: 0 K/UL (ref 0–0.01)
NRBC BLD-RTO: 0 PER 100 WBC
PERFORMED BY, TECHID: ABNORMAL
PERFORMED BY, TECHID: ABNORMAL
PLATELET # BLD AUTO: 336 K/UL (ref 150–400)
PMV BLD AUTO: 9.9 FL (ref 8.9–12.9)
POTASSIUM SERPL-SCNC: 3.8 MMOL/L (ref 3.5–5.1)
RBC # BLD AUTO: 4.85 M/UL (ref 4.1–5.7)
RBC MORPH BLD: ABNORMAL
SODIUM SERPL-SCNC: 135 MMOL/L (ref 136–145)
WBC # BLD AUTO: 14.9 K/UL (ref 4.1–11.1)

## 2021-02-03 PROCEDURE — 65270000029 HC RM PRIVATE

## 2021-02-03 PROCEDURE — 80048 BASIC METABOLIC PNL TOTAL CA: CPT

## 2021-02-03 PROCEDURE — 36415 COLL VENOUS BLD VENIPUNCTURE: CPT

## 2021-02-03 PROCEDURE — 74011250636 HC RX REV CODE- 250/636: Performed by: INTERNAL MEDICINE

## 2021-02-03 PROCEDURE — 83615 LACTATE (LD) (LDH) ENZYME: CPT

## 2021-02-03 PROCEDURE — 74011636637 HC RX REV CODE- 636/637: Performed by: NURSE PRACTITIONER

## 2021-02-03 PROCEDURE — 82962 GLUCOSE BLOOD TEST: CPT

## 2021-02-03 PROCEDURE — 74011250636 HC RX REV CODE- 250/636: Performed by: FAMILY MEDICINE

## 2021-02-03 PROCEDURE — 74011000250 HC RX REV CODE- 250: Performed by: FAMILY MEDICINE

## 2021-02-03 PROCEDURE — 77010033678 HC OXYGEN DAILY

## 2021-02-03 PROCEDURE — 85025 COMPLETE CBC W/AUTO DIFF WBC: CPT

## 2021-02-03 PROCEDURE — 94760 N-INVAS EAR/PLS OXIMETRY 1: CPT

## 2021-02-03 PROCEDURE — 74011000258 HC RX REV CODE- 258: Performed by: FAMILY MEDICINE

## 2021-02-03 PROCEDURE — XW033H5 INTRODUCTION OF TOCILIZUMAB INTO PERIPHERAL VEIN, PERCUTANEOUS APPROACH, NEW TECHNOLOGY GROUP 5: ICD-10-PCS | Performed by: HOSPITALIST

## 2021-02-03 PROCEDURE — 74011250637 HC RX REV CODE- 250/637: Performed by: INTERNAL MEDICINE

## 2021-02-03 PROCEDURE — 74011250637 HC RX REV CODE- 250/637: Performed by: FAMILY MEDICINE

## 2021-02-03 PROCEDURE — 86140 C-REACTIVE PROTEIN: CPT

## 2021-02-03 PROCEDURE — 74011000258 HC RX REV CODE- 258: Performed by: INTERNAL MEDICINE

## 2021-02-03 RX ORDER — MAGNESIUM SULFATE 100 %
4 CRYSTALS MISCELLANEOUS AS NEEDED
Status: DISCONTINUED | OUTPATIENT
Start: 2021-02-03 | End: 2021-02-07 | Stop reason: HOSPADM

## 2021-02-03 RX ORDER — INSULIN LISPRO 100 [IU]/ML
INJECTION, SOLUTION INTRAVENOUS; SUBCUTANEOUS
Status: DISCONTINUED | OUTPATIENT
Start: 2021-02-03 | End: 2021-02-07 | Stop reason: HOSPADM

## 2021-02-03 RX ORDER — DEXTROSE 50 % IN WATER (D50W) INTRAVENOUS SYRINGE
25-50 AS NEEDED
Status: DISCONTINUED | OUTPATIENT
Start: 2021-02-03 | End: 2021-02-07 | Stop reason: HOSPADM

## 2021-02-03 RX ORDER — DEXAMETHASONE SODIUM PHOSPHATE 4 MG/ML
4 INJECTION, SOLUTION INTRA-ARTICULAR; INTRALESIONAL; INTRAMUSCULAR; INTRAVENOUS; SOFT TISSUE EVERY 6 HOURS
Status: DISCONTINUED | OUTPATIENT
Start: 2021-02-03 | End: 2021-02-04

## 2021-02-03 RX ORDER — METOPROLOL TARTRATE 5 MG/5ML
2.5 INJECTION INTRAVENOUS
Status: DISCONTINUED | OUTPATIENT
Start: 2021-02-03 | End: 2021-02-07 | Stop reason: HOSPADM

## 2021-02-03 RX ORDER — ENOXAPARIN SODIUM 100 MG/ML
40 INJECTION SUBCUTANEOUS EVERY 12 HOURS
Status: DISCONTINUED | OUTPATIENT
Start: 2021-02-03 | End: 2021-02-07 | Stop reason: HOSPADM

## 2021-02-03 RX ORDER — DEXTROMETHORPHAN POLISTIREX 30 MG/5ML
30 SUSPENSION ORAL
Status: DISCONTINUED | OUTPATIENT
Start: 2021-02-03 | End: 2021-02-07 | Stop reason: HOSPADM

## 2021-02-03 RX ORDER — SILODOSIN 4 MG/1
8 CAPSULE ORAL
Status: DISCONTINUED | OUTPATIENT
Start: 2021-02-03 | End: 2021-02-07 | Stop reason: HOSPADM

## 2021-02-03 RX ADMIN — ASPIRIN 81 MG: 81 TABLET, CHEWABLE ORAL at 08:05

## 2021-02-03 RX ADMIN — DEXAMETHASONE SODIUM PHOSPHATE 6 MG: 4 INJECTION, SOLUTION INTRA-ARTICULAR; INTRALESIONAL; INTRAMUSCULAR; INTRAVENOUS; SOFT TISSUE at 06:08

## 2021-02-03 RX ADMIN — Medication 5 ML: at 14:00

## 2021-02-03 RX ADMIN — Medication 10 ML: at 21:50

## 2021-02-03 RX ADMIN — INSULIN LISPRO 2 UNITS: 100 INJECTION, SOLUTION INTRAVENOUS; SUBCUTANEOUS at 21:48

## 2021-02-03 RX ADMIN — SILODOSIN 8 MG: 4 CAPSULE ORAL at 21:49

## 2021-02-03 RX ADMIN — DEXAMETHASONE SODIUM PHOSPHATE 4 MG: 4 INJECTION, SOLUTION INTRA-ARTICULAR; INTRALESIONAL; INTRAMUSCULAR; INTRAVENOUS; SOFT TISSUE at 16:43

## 2021-02-03 RX ADMIN — REMDESIVIR 100 MG: 100 INJECTION, POWDER, LYOPHILIZED, FOR SOLUTION INTRAVENOUS at 21:43

## 2021-02-03 RX ADMIN — CHOLECALCIFEROL TAB 25 MCG (1000 UNIT) 2000 UNITS: 25 TAB at 08:05

## 2021-02-03 RX ADMIN — ENOXAPARIN SODIUM 40 MG: 40 INJECTION SUBCUTANEOUS at 08:05

## 2021-02-03 RX ADMIN — INSULIN LISPRO 2 UNITS: 100 INJECTION, SOLUTION INTRAVENOUS; SUBCUTANEOUS at 16:46

## 2021-02-03 RX ADMIN — TOCILIZUMAB 400 MG: 20 INJECTION, SOLUTION, CONCENTRATE INTRAVENOUS at 13:16

## 2021-02-03 RX ADMIN — OXYCODONE HYDROCHLORIDE AND ACETAMINOPHEN 1000 MG: 500 TABLET ORAL at 08:05

## 2021-02-03 RX ADMIN — AZITHROMYCIN DIHYDRATE 500 MG: 500 INJECTION, POWDER, LYOPHILIZED, FOR SOLUTION INTRAVENOUS at 22:37

## 2021-02-03 RX ADMIN — DEXAMETHASONE SODIUM PHOSPHATE 4 MG: 4 INJECTION, SOLUTION INTRA-ARTICULAR; INTRALESIONAL; INTRAMUSCULAR; INTRAVENOUS; SOFT TISSUE at 21:51

## 2021-02-03 RX ADMIN — Medication 10 ML: at 06:08

## 2021-02-03 RX ADMIN — ENOXAPARIN SODIUM 40 MG: 40 INJECTION SUBCUTANEOUS at 21:46

## 2021-02-03 RX ADMIN — Medication 1 CAPSULE: at 08:05

## 2021-02-03 NOTE — PROGRESS NOTES
Reason for Admission:   COVID 19 & Acute Respiratory                   RUR Score:  12% Low                   Plan for utilizing home health:    No prior HH. not currently open w/HH. Declined HH at time discharge. PCP: First and Last name:  Arielle Wallis MD   Name of Practice:    Are you a current patient: Yes/No:  Yes   Approximate date of last visit: Last week   Can you participate in a virtual visit with your PCP: Yes                    Current Advanced Directive/Advance Care Plan: FULL Code. Advance Care Planning     General Advance Care Planning (ACP) Conversation      Date of Conversation: 2/3/2021    Conducted with: Patient    Healthcare Decision Maker: Sharin Sandhoff @ [phone]    Click here to [address] including selection of the Healthcare Decision Maker Relationship (ie \"Primary\")      Content/Action Overview:   Patient desires FULL Code and resuscitation. Length of Voluntary ACP Conversation in minutes:  15 minutes                            Transition of Care Plan:                      Lives in a ranch style home w/spouse. Last seen by PCP x 1 week. Polypharmacy ([facility]) both in [place], [place]. Patient is independent w/all ADL's & IADL's and requires no assistance. Patient drives himself to/from all medical appointment's. No prior HH, SNF, and or IRF in the past.  Declined HH/SNF at time of discharge. Requires Medicare 2nd IM letter prior to discharge. Care Management Interventions  PCP Verified by CM: Yes(x 1 Week)  Mode of Transport at Discharge: Other (see comment)(Family)  Transition of Care Consult (CM Consult): Discharge Planning  Discharge Durable Medical Equipment: (No home O2.   DME (shower chair))  Current Support Network: Lives with Spouse, Own Home(Ranch style home)  Confirm Follow Up Transport: Family  Discharge Location  Discharge Placement: [address], [place]

## 2021-02-03 NOTE — PROGRESS NOTES
Initial skin assessment done by Jameson Stern Rn and myself.  No skin breakdown noted, all skin clean, dry and intact

## 2021-02-03 NOTE — PROGRESS NOTES
Problem: Falls - Risk of  Goal: *Absence of Falls  Description: Document Isamarmon Jake Fall Risk and appropriate interventions in the flowsheet.   Outcome: Progressing Towards Goal  Note: Fall Risk Interventions:                                Problem: Patient Education: Go to Patient Education Activity  Goal: Patient/Family Education  Outcome: Progressing Towards Goal

## 2021-02-03 NOTE — CONSULTS
Consult  Pulmonary, Critical Care    Name: Milagros Avelar MRN: [id number]   : 1945 Hospital: [address] [place] [address]   Date: 2/3/2021  Admission date: 2021 Hospital Day: 3       Subjective/Interval History:   Seen on the medical floor for acute hypoxic respiratory failure with COVID-19 testing positive and chest x-ray consistent with Covid pneumonitis. Patient has had cough and shortness of breath for about 2 weeks. Does not know where he was exposed to COVID-19. He does not look distressed but is on a 100% nonrebreather mask with oxygen saturation 96%. He has BPH and is on Rapaflo which is not carried by the hospital and he is worried about being off of it as well as several of his other medications    Hospital Problems  Date Reviewed: 2021          Codes Class Noted POA    Pneumonia ICD-10-CM: J18.9  ICD-9-CM: 928  2021 Yes        Acute respiratory failure with hypoxia ([facility] [address] ICD-10-CM: J96.01  ICD-9-CM: 518.81  2021 Yes        COVID-19 ICD-10-CM: U07.1  ICD-9-CM: 079.89  2021 Yes              IMPRESSION:   1. Acute hypoxic respiratory failure  2. COVID-19 pneumonitis  3. COVID-19 infection  4. Prostate hypertrophy  5. Hypertension  6. Coronary artery disease  7. Reportedly chronic herpetic skin infection  8. Body mass index is 32.49 kg/m². 9.       RECOMMENDATIONS/PLAN:   1. Currently on nonrebreather mask if he develops worsening hypoxia will need to switch to noninvasive ventilator  2. Agree with Decadron will increase to every 6 hours dosing  3. We will give 2 doses Actemra  4. Have asked him to do self incentive spirometry hourly  5. Agree with Remdesivir  6. Have spoken with the pharmacy about resuming his home personal Rapaflo  7. Agree with continue to hold Norvasc Lopressor and irbesartan due to his current blood pressure  8.            [x] High complexity decision making was performed  [x] See my orders for details      Subjective/Initial History:     I was asked by Feli Melendez MD to see Dearl Jen boo 77 y.o.  male in consultation for a chief complaint of acute hypoxic respiratory failure with COVID-19 infection    No Known Allergies     MAR reviewed and pertinent medications noted or modified as needed   Home medications include:  Acyclovir 400 twice daily  Norvasc 5 mg daily  Irbesartan 300 mg daily  Lopressor 25 mg a.m. and 50 mg nightly  Silodosin 8 mg daily  Aspirin 81 daily  Current Facility-Administered Medications   Medication    dexamethasone (DECADRON) 4 mg/mL injection 4 mg    tocilizumab 400 mg in 0.9% sodium chloride 100 mL infusion    insulin lispro (HUMALOG) injection    glucose chewable tablet 16 g    glucagon (GLUCAGEN) injection 1 mg    dextrose (D50W) injection syrg 12.5-25 g    metoprolol (LOPRESSOR) injection 2.5 mg    enoxaparin (LOVENOX) injection 40 mg    OTHER(NON-FORMULARY) 8 mg    aspirin chewable tablet 81 mg    cholecalciferol (VITAMIN D3) (1000 Units /25 mcg) tablet 2,000 Units    ascorbic acid (vitamin C) (VITAMIN C) tablet 1,000 mg    zinc sulfate (ZINCATE) 220 (50) mg capsule 1 Cap    azithromycin (ZITHROMAX) 500 mg in 0.9% sodium chloride 250 mL (VIAL-MATE)    sodium chloride (NS) flush 5-40 mL    sodium chloride (NS) flush 5-40 mL    acetaminophen (TYLENOL) tablet 650 mg    Or    acetaminophen (TYLENOL) suppository 650 mg    polyethylene glycol (MIRALAX) packet 17 g    promethazine (PHENERGAN) tablet 12.5 mg    Or    ondansetron (ZOFRAN) injection 4 mg    remdesivir 100 mg in 0.9% sodium chloride 250 mL IVPB      Patient PCP: Blas, MD Yasir  PMH:  has a past medical history of Bladder cancer ([facility] [address] CAD (coronary artery disease), HSV infection, and Hypertension. PSH:   has a past surgical history that includes hx urological and pr cardiac surg procedure unlist.   FHX: family history includes Coronary Artery Disease in an other family member; Hypertension in his father.    SHX:  reports that he has quit smoking. He has never used smokeless tobacco. He reports previous alcohol use. He reports that he does not use drugs. Smoked from his early teenage years up until the age of 62 about a pack a day     Systemic review:  General weight has been stable with no chronic fever chills or sweats. He has had cough for 2 weeks and loss of taste  Eyes no double vision or momentary blindness  ENT smell seems intact no sinus congestion or drainage  Endocrinologic no polyuria polydipsia  Musculoskeletal no swollen tender joints  Neurologic no seizures or syncope  Gastrointestinal poor appetite no nausea or vomiting sense of taste does not seem quite right to him  Genitourinary has prostate hypertrophy is on Xeloda 7 4 prostate hypertrophy. Has good flow as long as he takes it  Cardiovascular has a history of heart disease is followed up in [address] does not have any recent chest pain diaphoresis ankle edema or nocturia  Respiratory cough mostly nonproductive for the last week gradually worsening with shortness of breath    Objective:     Vital Signs: Telemetry:    normal sinus rhythm Intake/Output:   Visit Vitals  /75 (BP 1 Location: Right upper arm, BP Patient Position: Sitting)   Pulse 82   Temp 98 °F (36.7 °C)   Resp 18   Ht 5' 9\" (1.753 m)   Wt 99.8 kg (220 lb)   SpO2 91%   BMI 32.49 kg/m²       Temp (24hrs), Av.7 °F (36.5 °C), Min:97.4 °F (36.3 °C), Max:98 °F (36.7 °C)        O2 Device: Nasal cannula, Non-rebreather mask O2 Flow Rate (L/min): 15 l/min       Wt Readings from Last 4 Encounters:   21 99.8 kg (220 lb)        No intake or output data in the 24 hours ending 21 1318    Last shift:      No intake/output data recorded. Last 3 shifts: No intake/output data recorded.        Physical Exam:   General:  male; sitting on the side of the bed in no distress but is wearing 100% oxygen nonrebreather  HEENT: NCAT, oral mucosa normal  Eyes: anicteric; conjunctiva clear extraocular movements intact  Neck: no nodes, no definite JVD but obesity obscures the exam no accessory MM use. Chest: no deformity,  Cardiac: Regular rate and rhythm no murmur no edema  Lungs: Clear anteriorly and laterally with just a few rales in the bases  Abd: Soft nontender normal bowel sounds  Ext: no edema; no joint swelling; No clubbing  :clear urine  Neuro: Awake alert oriented speech is clear moves all 4 extremities  Psych- no agitation, oriented to person;   Skin: warm, dry, no cyanosis;  Pulses: Brachial radial femoral pulses intact  Capillary: Normal capillary refill    Labs:    Recent Labs     02/03/21 1135 02/02/21 0330 02/01/21 1950   WBC 14.9* 7.0 6.3   HGB 14.9 14.4 15.6    259 252     Recent Labs     02/03/21 1135 02/02/21 0330 02/01/21 1950   * 134* 133*   K 3.8 4.2 4.1    101 99   CO2 23 25 26   * 139* 109*   BUN 27* 19 19   CREA 1.04 1.10 1.14   CA 8.7 8.5 8.9   MG  --  2.0  --    LAC  --   --  1.4   ALB  --   --  3.0*   ALT  --   --  52   100% nasal high flow with oxygen saturation 96%    Ferritin 735  CRP 10.5  COVID-19 antigen positive  Lab Results   Component Value Date/Time    Culture result:  02/01/2021 07:50 PM     Gram Positive Rods  CALLED TO AND READ BACK BY  ONESIMO MORALES AT 7400 BY JF  Single bottle drawn has been flagged positive by instumentation. Bottle has been sent to [address] [place] [address] Laboratory for determination of possible growth. Culture result:  02/01/2021 07:50 PM     Bacillus species, not anthracis GROWING IN THE AEROBIC BOTTLE     Imaging:    CXR Results  (Last 48 hours)               02/01/21 1838  XR CHEST SNGL V Final result    Impression:  Findings/impression:       Multifocal patchy interstitial airspace disease. No pleural effusion or   pneumothorax. Cardiomediastinal contours are within normal limits. No acute osseous abnormality identified. Narrative:  Study: XR CHEST SNGL V       Clinical indication: COVID +       Comparison: None. Results from Hospital Encounter encounter on 02/01/21   XR CHEST SNGL V    Narrative Study: XR CHEST SNGL V    Clinical indication: COVID +    Comparison: None. Impression Findings/impression:    Multifocal patchy interstitial airspace disease. No pleural effusion or  pneumothorax. Cardiomediastinal contours are within normal limits. No acute osseous abnormality identified. · Discussion COVID-19 pneumonitis with acute hypoxic respiratory failure currently on 100% nonrebreather. Have encouraged him to do self incentive spirometry and continue to monitor oxygen.   He has been started on Remdesivir and I will add Actemra 2 doses and adjust the Decadron dose    Sabrina Lunsford MD

## 2021-02-03 NOTE — ROUTINE PROCESS
TRANSFER - OUT REPORT: 
 
Verbal report given to Zack Sevilla RN on Rush Schultz  being transferred to [facility] for routine progression of care Report consisted of patients Situation, Background, Assessment and  
Recommendations(SBAR). Information from the following report(s) SBAR, ED Summary, Intake/Output, MAR and Recent Results was reviewed with the receiving nurse. Lines:  
Peripheral IV 02/01/21 Distal;Left Antecubital (Active) Site Assessment Clean, dry, & intact 02/01/21 2228 Phlebitis Assessment 0 02/01/21 2228 Infiltration Assessment 0 02/01/21 2228 Dressing Status Clean, dry, & intact 02/01/21 2228 Dressing Type Tape;Transparent 02/01/21 2228 Opportunity for questions and clarification was provided. Patient transported with: 
 O2 @ 15 liters Registered Nurse Tele box, verified

## 2021-02-03 NOTE — PROGRESS NOTES
Hospitalist Progress Note    Subjective:   Daily Progress Note: 2/3/2021 12:17 PM    Hospital Course:  Kizzy Li is a 77 y.o. male with past medical history of hypertension and coronary artery disease presenting to the ER with complaints of shortness of breath, cough, confusion, fatigue and generalized weakness. Shortness of breath and cough have been present for the past 2 weeks. Patient was found to be positive for COVID-19 3 days ago. Mr. Dayana Blakely denies recent fever, chills, nausea, vomiting, chest pain, palpitations, diarrhea or abdominal pain. This evening, emergency medical services were called for increased work of breathing, confusion and worsening shortness of breath. On arrival to patient's home, EMS found patient to have oxygen saturations of 74% on room air. Mr. Dayana Blakely was placed on a NRB and transported to the ER for further treatment and evaluation. Subjective: pt seen in room , on 100% NRB mask, O2 saturation 93-94%. Complaints of mild shortness of breath on exertion, mild cough.      Current Facility-Administered Medications   Medication Dose Route Frequency    aspirin chewable tablet 81 mg  81 mg Oral DAILY    cholecalciferol (VITAMIN D3) (1000 Units /25 mcg) tablet 2,000 Units  2,000 Units Oral DAILY    ascorbic acid (vitamin C) (VITAMIN C) tablet 1,000 mg  1,000 mg Oral DAILY    zinc sulfate (ZINCATE) 220 (50) mg capsule 1 Cap  1 Cap Oral DAILY    azithromycin (ZITHROMAX) 500 mg in 0.9% sodium chloride 250 mL (VIAL-MATE)  500 mg IntraVENous Q24H    dexamethasone (DECADRON) 4 mg/mL injection 6 mg  6 mg IntraVENous Q8H    sodium chloride (NS) flush 5-40 mL  5-40 mL IntraVENous Q8H    sodium chloride (NS) flush 5-40 mL  5-40 mL IntraVENous PRN    acetaminophen (TYLENOL) tablet 650 mg  650 mg Oral Q6H PRN    Or    acetaminophen (TYLENOL) suppository 650 mg  650 mg Rectal Q6H PRN    polyethylene glycol (MIRALAX) packet 17 g  17 g Oral DAILY PRN    promethazine (PHENERGAN) tablet 12.5 mg  12.5 mg Oral Q6H PRN    Or    ondansetron (ZOFRAN) injection 4 mg  4 mg IntraVENous Q6H PRN    enoxaparin (LOVENOX) injection 40 mg  40 mg SubCUTAneous DAILY    remdesivir 100 mg in 0.9% sodium chloride 250 mL IVPB  100 mg IntraVENous Q24H        Review of Systems:    Review of Systems   Constitutional: Negative for chills and fever. HENT: Negative for congestion and sore throat. Respiratory: Positive for cough and shortness of breath. Cardiovascular: Negative for chest pain and palpitations. Gastrointestinal: Negative for abdominal pain, heartburn, nausea and vomiting. Genitourinary: Negative for dysuria and urgency. Neurological: Negative for dizziness and headaches. Objective:     Visit Vitals  /75 (BP 1 Location: Right upper arm, BP Patient Position: Sitting)   Pulse 82   Temp 98 °F (36.7 °C)   Resp 18   Ht 5' 9\" (1.753 m)   Wt 99.8 kg (220 lb)   SpO2 91%   BMI 32.49 kg/m²    O2 Flow Rate (L/min): 15 l/min O2 Device: Nasal cannula, Non-rebreather mask    Temp (24hrs), Av.7 °F (36.5 °C), Min:97.4 °F (36.3 °C), Max:98 °F (36.7 °C)      No intake/output data recorded. No intake/output data recorded. PHYSICAL EXAM:    Physical Exam   Constitutional: He appears well-developed. No distress. Neck: Normal range of motion. Neck supple. Cardiovascular: Normal rate, normal heart sounds and intact distal pulses. Pulmonary/Chest: Effort normal. He has decreased breath sounds in the right lower field and the left lower field. Abdominal: Soft. Bowel sounds are normal.   Musculoskeletal: Normal range of motion. Neurological: He is alert. Skin: Skin is warm and dry. Psychiatric: He has a normal mood and affect.  His behavior is normal.          Data Review    Recent Results (from the past 24 hour(s))   SARS-COV-2    Collection Time: 21  8:51 PM   Result Value Ref Range    SARS-CoV-2 Please find results under separate order     COVID-19 RAPID TEST Collection Time: 02/02/21  8:51 PM   Result Value Ref Range    Specimen source Nasopharyngeal      COVID-19 rapid test DETECTED (A) Not Detected         XR CHEST SNGL V   Final Result   Findings/impression:      Multifocal patchy interstitial airspace disease. No pleural effusion or   pneumothorax. Cardiomediastinal contours are within normal limits. No acute osseous abnormality identified. Active Problems:    Pneumonia (2/1/2021)      Acute respiratory failure with hypoxia ([address] (2/1/2021)      COVID-19 (2/1/2021)        Assessment/Plan:     1. Acute respiratory failure with hypoxia - secondary to covid 19 pneumonitis- on remdesivir, IV steroids, azithromycin CXR showing patchy interstitial airspace disease. On 100% NRB, wean O2 to NC as tolerated, consult to pulmonary    2. HX of Hypertension- hold home BP meds for now    3.  Generalized weakness- OOB to chair, incentive spirometry, OT/PT consults      DVT Prophylaxis:lovenox  Code Status:  Full Code  POA/NOROMA Mariana Coopere, son, [facility] discussed with:   ________patient, staff nurse_______________________________________________________    Mayte Mccormick NP

## 2021-02-04 LAB
GLUCOSE BLD STRIP.AUTO-MCNC: 142 MG/DL (ref 65–100)
GLUCOSE BLD STRIP.AUTO-MCNC: 167 MG/DL (ref 65–100)
GLUCOSE BLD STRIP.AUTO-MCNC: 173 MG/DL (ref 65–100)
GLUCOSE BLD STRIP.AUTO-MCNC: 175 MG/DL (ref 65–100)
PERFORMED BY, TECHID: ABNORMAL

## 2021-02-04 PROCEDURE — 97530 THERAPEUTIC ACTIVITIES: CPT

## 2021-02-04 PROCEDURE — 74011250637 HC RX REV CODE- 250/637: Performed by: INTERNAL MEDICINE

## 2021-02-04 PROCEDURE — 74011000258 HC RX REV CODE- 258: Performed by: FAMILY MEDICINE

## 2021-02-04 PROCEDURE — 94760 N-INVAS EAR/PLS OXIMETRY 1: CPT

## 2021-02-04 PROCEDURE — 74011250637 HC RX REV CODE- 250/637: Performed by: FAMILY MEDICINE

## 2021-02-04 PROCEDURE — 82962 GLUCOSE BLOOD TEST: CPT

## 2021-02-04 PROCEDURE — 77010033678 HC OXYGEN DAILY

## 2021-02-04 PROCEDURE — 74011250636 HC RX REV CODE- 250/636: Performed by: INTERNAL MEDICINE

## 2021-02-04 PROCEDURE — 74011000250 HC RX REV CODE- 250: Performed by: FAMILY MEDICINE

## 2021-02-04 PROCEDURE — 65270000029 HC RM PRIVATE

## 2021-02-04 PROCEDURE — 97161 PT EVAL LOW COMPLEX 20 MIN: CPT

## 2021-02-04 PROCEDURE — 74011636637 HC RX REV CODE- 636/637: Performed by: NURSE PRACTITIONER

## 2021-02-04 RX ORDER — DEXAMETHASONE SODIUM PHOSPHATE 4 MG/ML
4 INJECTION, SOLUTION INTRA-ARTICULAR; INTRALESIONAL; INTRAMUSCULAR; INTRAVENOUS; SOFT TISSUE EVERY 12 HOURS
Status: DISCONTINUED | OUTPATIENT
Start: 2021-02-04 | End: 2021-02-05

## 2021-02-04 RX ADMIN — ENOXAPARIN SODIUM 40 MG: 40 INJECTION SUBCUTANEOUS at 09:22

## 2021-02-04 RX ADMIN — Medication 1 CAPSULE: at 09:22

## 2021-02-04 RX ADMIN — DEXAMETHASONE SODIUM PHOSPHATE 4 MG: 4 INJECTION, SOLUTION INTRA-ARTICULAR; INTRALESIONAL; INTRAMUSCULAR; INTRAVENOUS; SOFT TISSUE at 12:37

## 2021-02-04 RX ADMIN — DEXAMETHASONE SODIUM PHOSPHATE 4 MG: 4 INJECTION, SOLUTION INTRA-ARTICULAR; INTRALESIONAL; INTRAMUSCULAR; INTRAVENOUS; SOFT TISSUE at 05:18

## 2021-02-04 RX ADMIN — REMDESIVIR 100 MG: 100 INJECTION, POWDER, LYOPHILIZED, FOR SOLUTION INTRAVENOUS at 22:39

## 2021-02-04 RX ADMIN — CHOLECALCIFEROL TAB 25 MCG (1000 UNIT) 2000 UNITS: 25 TAB at 09:22

## 2021-02-04 RX ADMIN — Medication 10 ML: at 22:41

## 2021-02-04 RX ADMIN — ENOXAPARIN SODIUM 40 MG: 40 INJECTION SUBCUTANEOUS at 22:40

## 2021-02-04 RX ADMIN — Medication 5 ML: at 14:00

## 2021-02-04 RX ADMIN — Medication 10 ML: at 13:29

## 2021-02-04 RX ADMIN — INSULIN LISPRO 3 UNITS: 100 INJECTION, SOLUTION INTRAVENOUS; SUBCUTANEOUS at 12:37

## 2021-02-04 RX ADMIN — OXYCODONE HYDROCHLORIDE AND ACETAMINOPHEN 1000 MG: 500 TABLET ORAL at 09:22

## 2021-02-04 RX ADMIN — ASPIRIN 81 MG: 81 TABLET, CHEWABLE ORAL at 09:22

## 2021-02-04 RX ADMIN — DEXAMETHASONE SODIUM PHOSPHATE 4 MG: 4 INJECTION, SOLUTION INTRA-ARTICULAR; INTRALESIONAL; INTRAMUSCULAR; INTRAVENOUS; SOFT TISSUE at 22:39

## 2021-02-04 RX ADMIN — Medication 10 ML: at 05:39

## 2021-02-04 RX ADMIN — INSULIN LISPRO 2 UNITS: 100 INJECTION, SOLUTION INTRAVENOUS; SUBCUTANEOUS at 16:56

## 2021-02-04 RX ADMIN — INSULIN LISPRO 3 UNITS: 100 INJECTION, SOLUTION INTRAVENOUS; SUBCUTANEOUS at 09:22
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
## 2021-02-04 NOTE — PROGRESS NOTES
Hospitalist Progress Note    Subjective:   Daily Progress Note: 2/4/2021 5:19 PM    Hospital Course:  Aamir Aguilera a 77 y. o. male with past medical history of hypertension and coronary artery disease presenting to the ER with complaints of shortness of breath, cough, confusion, fatigue and generalized weakness.  Shortness of breath and cough have been present for the past 2 weeks.  Patient was found to be positive for COVID-19 3 days ago. Callum Barefoot. Arehart denies recent fever, chills, nausea, vomiting, chest pain, palpitations, diarrhea or abdominal pain.  This evening, emergency medical services were called for increased work of breathing, confusion and worsening shortness of breath.  On arrival to patient's home, EMS found patient to have oxygen saturations of 74% on room air. Subjective: Pt seen in his room, sitting up in the chair, on NC O2 at 4L, O2 saturation 94%,  States feels better today, less coughing. Still slightly dsypneic on exertion when ambulating to bathroom.      Current Facility-Administered Medications   Medication Dose Route Frequency    dexamethasone (DECADRON) 4 mg/mL injection 4 mg  4 mg IntraVENous Q12H    insulin lispro (HUMALOG) injection   SubCUTAneous AC&HS    glucose chewable tablet 16 g  4 Tab Oral PRN    glucagon (GLUCAGEN) injection 1 mg  1 mg IntraMUSCular PRN    dextrose (D50W) injection syrg 12.5-25 g  25-50 mL IntraVENous PRN    metoprolol (LOPRESSOR) injection 2.5 mg  2.5 mg IntraVENous Q6H PRN    enoxaparin (LOVENOX) injection 40 mg  40 mg SubCUTAneous Q12H    silodosin (RAPAFLO) capsule 8 mg (Patient Supplied)  8 mg Oral QHS    dextromethorphan (DELSYM) 30 mg/5 mL syrup 30 mg  30 mg Oral BID PRN    aspirin chewable tablet 81 mg  81 mg Oral DAILY    cholecalciferol (VITAMIN D3) (1000 Units /25 mcg) tablet 2,000 Units  2,000 Units Oral DAILY    ascorbic acid (vitamin C) (VITAMIN C) tablet 1,000 mg  1,000 mg Oral DAILY    zinc sulfate (ZINCATE) 220 (50) mg capsule 1 Cap  1 Cap Oral DAILY    azithromycin (ZITHROMAX) 500 mg in 0.9% sodium chloride 250 mL (VIAL-MATE)  500 mg IntraVENous Q24H    sodium chloride (NS) flush 5-40 mL  5-40 mL IntraVENous Q8H    sodium chloride (NS) flush 5-40 mL  5-40 mL IntraVENous PRN    acetaminophen (TYLENOL) tablet 650 mg  650 mg Oral Q6H PRN    Or    acetaminophen (TYLENOL) suppository 650 mg  650 mg Rectal Q6H PRN    polyethylene glycol (MIRALAX) packet 17 g  17 g Oral DAILY PRN    promethazine (PHENERGAN) tablet 12.5 mg  12.5 mg Oral Q6H PRN    Or    ondansetron (ZOFRAN) injection 4 mg  4 mg IntraVENous Q6H PRN    remdesivir 100 mg in 0.9% sodium chloride 250 mL IVPB  100 mg IntraVENous Q24H        Review of Systems:    Review of Systems   Constitutional: Negative for chills and fever. HENT: Negative for congestion and sore throat. Respiratory: Positive for cough and shortness of breath. Gastrointestinal: Negative for heartburn, nausea and vomiting. Genitourinary: Positive for urgency. Negative for dysuria. Neurological: Negative for dizziness and headaches. Objective:     Visit Vitals  /68 (BP Patient Position: Sitting)   Pulse 62   Temp 98 °F (36.7 °C)   Resp 20   Ht 5' 9\" (1.753 m)   Wt 99.8 kg (220 lb)   SpO2 93%   BMI 32.49 kg/m²    O2 Flow Rate (L/min): 6 l/min O2 Device: Nasal cannula    Temp (24hrs), Av.1 °F (36.7 °C), Min:97.8 °F (36.6 °C), Max:98.4 °F (36.9 °C)      No intake/output data recorded. No intake/output data recorded. PHYSICAL EXAM:    Physical Exam   Constitutional: He is oriented to person, place, and time. He appears well-developed. No distress. Neck: Normal range of motion. Neck supple. Cardiovascular: Normal rate, regular rhythm, normal heart sounds and intact distal pulses. Pulmonary/Chest: Effort normal and breath sounds normal.   Musculoskeletal: Normal range of motion. Neurological: He is alert and oriented to person, place, and time.    Skin: Skin is warm and dry. Psychiatric: He has a normal mood and affect. His behavior is normal.          Data Review    Recent Results (from the past 24 hour(s))   GLUCOSE, POC    Collection Time: 02/03/21  8:11 PM   Result Value Ref Range    Glucose (POC) 192 (H) 65 - 100 mg/dL    Performed by Shaneka Rios 10, POC    Collection Time: 02/04/21  7:55 AM   Result Value Ref Range    Glucose (POC) 173 (H) 65 - 100 mg/dL    Performed by Piper Herrera    GLUCOSE, POC    Collection Time: 02/04/21 11:36 AM   Result Value Ref Range    Glucose (POC) 175 (H) 65 - 100 mg/dL    Performed by Piper Herrera    GLUCOSE, POC    Collection Time: 02/04/21  4:27 PM   Result Value Ref Range    Glucose (POC) 167 (H) 65 - 100 mg/dL    Performed by BRIJESH GAMINO        XR CHEST SNGL V   Final Result   Findings/impression:      Multifocal patchy interstitial airspace disease. No pleural effusion or   pneumothorax. Cardiomediastinal contours are within normal limits. No acute osseous abnormality identified. Active Problems:    Pneumonia (2/1/2021)      Acute respiratory failure with hypoxia ([address] (2/1/2021)      COVID-19 (2/1/2021)        Assessment/Plan:   1. Acute respiratory failure with hypoxia - secondary to covid 19 pneumonitis- on remdesivir x 5 days. IV steroids, azithromycin CXR showing patchy interstitial airspace disease. Weaned to nasal cannula, on 4 L , keep O2 saturation above 92%. Pulmonary following.      2.  HX of Hypertension- BP acceptable range, hold medications     3. Generalized weakness- OOB to chair, incentive spirometry, OT/PT consults        DVT Prophylaxis: lovenox  Code Status:  Full Code  POA:GOSIA Bautista, son, [phone] discussed with:   ___________staff nurse, patient____________________________________________________    Vazquez Patel NP

## 2021-02-04 NOTE — CONSULTS
Consult  Pulmonary, Critical Care    Name: Rush Schultz MRN: [id number]   : 1945 Hospital: [facility]   Date: 2021  Admission date: 2021 Hospital Day: 4       Subjective/Interval History:   Seen on the medical floor for acute hypoxic respiratory failure with COVID-19 testing positive and chest x-ray consistent with Covid pneumonitis. Patient has had cough and shortness of breath for about 2 weeks. Does not know where he was exposed to COVID-19. He does not look distressed but is on a 100% nonrebreather mask with oxygen saturation 96%. He has BPH and is on Rapaflo which is not carried by the hospital and he is worried about being off of it as well as several of his other medications  / markedly improved have been switched to nasal oxygen at 6 L he states his breathing feels comfortable with no cough  Hospital Problems  Date Reviewed: 2021          Codes Class Noted POA    Pneumonia ICD-10-CM: J18.9  ICD-9-CM: 581  2021 Yes        Acute respiratory failure with hypoxia ([address] ICD-10-CM: J96.01  ICD-9-CM: 518.81  2021 Yes        COVID-19 ICD-10-CM: U07.1  ICD-9-CM: 079.89  2021 Yes              IMPRESSION:   1. Acute hypoxic respiratory failure improving continue to taper oxygen  2. COVID-19 pneumonitis  3. COVID-19 infection  4. Prostate hypertrophy  5. Hypertension  6. Coronary artery disease  7. Reportedly chronic herpetic skin infection  Body mass index is 32.49 kg/m². 8.       RECOMMENDATIONS/PLAN:   1. Tolerating nasal oxygen today will taper per protocol  2. Therapy has decreased will decrease Decadron  3. Complete second dose Actemra today  4. Continue self incentive spirometry hourly  5. Continue remdesivir  6. Agree with continue to hold Norvasc Lopressor and irbesartan due to his current blood pressure  7.            [x] High complexity decision making was performed  [x] See my orders for details      Subjective/Initial History:     I was asked by Karen Pandey MD to see Bridger Cruz  a 77 y.o.  male in consultation for a chief complaint of acute hypoxic respiratory failure with COVID-19 infection    No Known Allergies     MAR reviewed and pertinent medications noted or modified as needed   Home medications include:  Acyclovir 400 twice daily  Norvasc 5 mg daily  Irbesartan 300 mg daily  Lopressor 25 mg a.m. and 50 mg nightly  Silodosin 8 mg daily  Aspirin 81 daily  Current Facility-Administered Medications   Medication    dexamethasone (DECADRON) 4 mg/mL injection 4 mg    insulin lispro (HUMALOG) injection    glucose chewable tablet 16 g    glucagon (GLUCAGEN) injection 1 mg    dextrose (D50W) injection syrg 12.5-25 g    metoprolol (LOPRESSOR) injection 2.5 mg    enoxaparin (LOVENOX) injection 40 mg    silodosin (RAPAFLO) capsule 8 mg (Patient Supplied)    dextromethorphan (DELSYM) 30 mg/5 mL syrup 30 mg    aspirin chewable tablet 81 mg    cholecalciferol (VITAMIN D3) (1000 Units /25 mcg) tablet 2,000 Units    ascorbic acid (vitamin C) (VITAMIN C) tablet 1,000 mg    zinc sulfate (ZINCATE) 220 (50) mg capsule 1 Cap    azithromycin (ZITHROMAX) 500 mg in 0.9% sodium chloride 250 mL (VIAL-MATE)    sodium chloride (NS) flush 5-40 mL    sodium chloride (NS) flush 5-40 mL    acetaminophen (TYLENOL) tablet 650 mg    Or    acetaminophen (TYLENOL) suppository 650 mg    polyethylene glycol (MIRALAX) packet 17 g    promethazine (PHENERGAN) tablet 12.5 mg    Or    ondansetron (ZOFRAN) injection 4 mg    remdesivir 100 mg in 0.9% sodium chloride 250 mL IVPB      Patient PCP: Blas, MD Yasir  PMH:  has a past medical history of Bladder cancer ([facility] [address] CAD (coronary artery disease), HSV infection, and Hypertension. PSH:   has a past surgical history that includes hx urological and pr cardiac surg procedure unlist.   FHX: family history includes Coronary Artery Disease in an other family member; Hypertension in his father.    SHX:  reports that he has quit smoking. He has never used smokeless tobacco. He reports previous alcohol use. He reports that he does not use drugs. Smoked from his early teenage years up until the age of 62 about a pack a day     Systemic review:  General weight has been stable with no chronic fever chills or sweats. He has had cough for 2 weeks and loss of taste  Eyes no double vision or momentary blindness  ENT smell seems intact no sinus congestion or drainage  Endocrinologic no polyuria polydipsia  Musculoskeletal no swollen tender joints  Neurologic no seizures or syncope  Gastrointestinal poor appetite no nausea or vomiting sense of taste does not seem quite right to him  Genitourinary has prostate hypertrophy is on Xeloda 7 4 prostate hypertrophy. Has good flow as long as he takes it  Cardiovascular has a history of heart disease is followed up in [place] does not have any recent chest pain diaphoresis ankle edema or nocturia  Respiratory cough mostly nonproductive for the last week gradually worsening with shortness of breath    Objective:     Vital Signs: Telemetry:    normal sinus rhythm Intake/Output:   Visit Vitals  /64 (BP Patient Position: At rest;Supine)   Pulse 68   Temp 97.8 °F (36.6 °C)   Resp 20   Ht 5' 9\" (1.753 m)   Wt 99.8 kg (220 lb)   SpO2 92%   BMI 32.49 kg/m²       Temp (24hrs), Av.1 °F (36.7 °C), Min:97.8 °F (36.6 °C), Max:98.4 °F (36.9 °C)        O2 Device: Nasal cannula O2 Flow Rate (L/min): 6 l/min       Wt Readings from Last 4 Encounters:   21 99.8 kg (220 lb)        No intake or output data in the 24 hours ending 21 1449    Last shift:      No intake/output data recorded. Last 3 shifts: No intake/output data recorded.        Physical Exam:   General:  male; sitting on the side of the bed in no distress on 6 L nasal oxygen  HEENT: NCAT, oral mucosa normal  Eyes: anicteric; conjunctiva clear extraocular movements intact  Neck: no nodes, no definite JVD but obesity obscures the exam no accessory MM use. Chest: no deformity,  Cardiac: Regular rate and rhythm no murmur no edema  Lungs: Clear anteriorly and laterally and posteriorly  Abd: Soft nontender normal bowel sounds  Ext: no edema; no joint swelling; No clubbing  :clear urine  Neuro: Awake alert oriented speech is clear moves all 4 extremities  Psych- no agitation, oriented to person;   Skin: warm, dry, no cyanosis;  Pulses: Brachial radial femoral pulses intact  Capillary: Normal capillary refill    Labs:    Recent Labs     02/03/21 1135 02/02/21 0330 02/01/21 1950   WBC 14.9* 7.0 6.3   HGB 14.9 14.4 15.6    259 252     Recent Labs     02/03/21 1135 02/02/21 0330 02/01/21 1950   * 134* 133*   K 3.8 4.2 4.1    101 99   CO2 23 25 26   * 139* 109*   BUN 27* 19 19   CREA 1.04 1.10 1.14   CA 8.7 8.5 8.9   MG  --  2.0  --    LAC  --   --  1.4   ALB  --   --  3.0*   ALT  --   --  52   6 L nasal oxygen with oxygen saturation 98%    Ferritin 735  CRP 10.5  COVID-19 antigen positive  Lab Results   Component Value Date/Time    Culture result:  02/01/2021 07:50 PM     Gram Positive Rods  CALLED TO AND READ BACK BY  ONESIMO MORALES AT 0089 BY JF  Single bottle drawn has been flagged positive by instumentation. Bottle has been sent to [facility] for determination of possible growth. Culture result:  02/01/2021 07:50 PM     Bacillus species, not anthracis GROWING IN THE AEROBIC BOTTLE     Imaging:    CXR Results  (Last 48 hours)    None        Results from [place] encounter on 02/01/21   XR CHEST SNGL V    Narrative Study: XR CHEST SNGL V    Clinical indication: COVID +    Comparison: None. Impression Findings/impression:    Multifocal patchy interstitial airspace disease. No pleural effusion or  pneumothorax. Cardiomediastinal contours are within normal limits. No acute osseous abnormality identified.      · Discussion COVID-19 pneumonitis with acute hypoxic respiratory failure currently on 100% nonrebreather. Have encouraged him to do self incentive spirometry and continue to monitor oxygen. He has been started on Remdesivir and I will add Actemra 2 doses and adjust the Decadron dose  · 2/4 markedly improved feels good today.   Will start decreasing Decadron continue Jessica Maldonado MD

## 2021-02-05 LAB
BASOPHILS # BLD: 0 K/UL (ref 0–0.1)
BASOPHILS NFR BLD: 0 % (ref 0–1)
CRP SERPL-MCNC: 1.48 MG/DL (ref 0–0.6)
DIFFERENTIAL METHOD BLD: ABNORMAL
EOSINOPHIL # BLD: 0 K/UL (ref 0–0.4)
EOSINOPHIL NFR BLD: 0 % (ref 0–7)
ERYTHROCYTE [DISTWIDTH] IN BLOOD BY AUTOMATED COUNT: 14 % (ref 11.5–14.5)
GLUCOSE BLD STRIP.AUTO-MCNC: 155 MG/DL (ref 65–100)
GLUCOSE BLD STRIP.AUTO-MCNC: 186 MG/DL (ref 65–100)
GLUCOSE BLD STRIP.AUTO-MCNC: 221 MG/DL (ref 65–100)
GLUCOSE BLD STRIP.AUTO-MCNC: 261 MG/DL (ref 65–100)
HCT VFR BLD AUTO: 44.6 % (ref 36.6–50.3)
HGB BLD-MCNC: 14.8 G/DL (ref 12.1–17)
IMM GRANULOCYTES # BLD AUTO: 0.1 K/UL (ref 0–0.04)
IMM GRANULOCYTES NFR BLD AUTO: 0 % (ref 0–0.5)
LYMPHOCYTES # BLD: 0.9 K/UL (ref 0.8–3.5)
LYMPHOCYTES NFR BLD: 6 % (ref 12–49)
MCH RBC QN AUTO: 30.5 PG (ref 26–34)
MCHC RBC AUTO-ENTMCNC: 33.2 G/DL (ref 30–36.5)
MCV RBC AUTO: 92 FL (ref 80–99)
MONOCYTES # BLD: 0.6 K/UL (ref 0–1)
MONOCYTES NFR BLD: 4 % (ref 5–13)
NEUTS SEG # BLD: 13.3 K/UL (ref 1.8–8)
NEUTS SEG NFR BLD: 90 % (ref 32–75)
PERFORMED BY, TECHID: ABNORMAL
PLATELET # BLD AUTO: 399 K/UL (ref 150–400)
PMV BLD AUTO: 9.8 FL (ref 8.9–12.9)
RBC # BLD AUTO: 4.85 M/UL (ref 4.1–5.7)
WBC # BLD AUTO: 14.8 K/UL (ref 4.1–11.1)

## 2021-02-05 PROCEDURE — 77010033678 HC OXYGEN DAILY

## 2021-02-05 PROCEDURE — 86140 C-REACTIVE PROTEIN: CPT

## 2021-02-05 PROCEDURE — 74011250637 HC RX REV CODE- 250/637: Performed by: FAMILY MEDICINE

## 2021-02-05 PROCEDURE — 94760 N-INVAS EAR/PLS OXIMETRY 1: CPT

## 2021-02-05 PROCEDURE — 74011250636 HC RX REV CODE- 250/636: Performed by: NURSE PRACTITIONER

## 2021-02-05 PROCEDURE — 74011250637 HC RX REV CODE- 250/637: Performed by: INTERNAL MEDICINE

## 2021-02-05 PROCEDURE — 36415 COLL VENOUS BLD VENIPUNCTURE: CPT

## 2021-02-05 PROCEDURE — 65270000029 HC RM PRIVATE

## 2021-02-05 PROCEDURE — 74011636637 HC RX REV CODE- 636/637: Performed by: NURSE PRACTITIONER

## 2021-02-05 PROCEDURE — 74011000250 HC RX REV CODE- 250: Performed by: FAMILY MEDICINE

## 2021-02-05 PROCEDURE — 74011250636 HC RX REV CODE- 250/636: Performed by: FAMILY MEDICINE

## 2021-02-05 PROCEDURE — 85025 COMPLETE CBC W/AUTO DIFF WBC: CPT

## 2021-02-05 PROCEDURE — 74011250636 HC RX REV CODE- 250/636: Performed by: INTERNAL MEDICINE

## 2021-02-05 PROCEDURE — 82962 GLUCOSE BLOOD TEST: CPT

## 2021-02-05 PROCEDURE — 74011000258 HC RX REV CODE- 258: Performed by: FAMILY MEDICINE

## 2021-02-05 RX ORDER — DEXAMETHASONE 4 MG/1
4 TABLET ORAL EVERY 12 HOURS
Status: DISCONTINUED | OUTPATIENT
Start: 2021-02-05 | End: 2021-02-06

## 2021-02-05 RX ORDER — DEXAMETHASONE 4 MG/1
4 TABLET ORAL EVERY 12 HOURS
Status: DISCONTINUED | OUTPATIENT
Start: 2021-02-05 | End: 2021-02-05

## 2021-02-05 RX ORDER — METOPROLOL TARTRATE 25 MG/1
25 TABLET, FILM COATED ORAL EVERY 12 HOURS
Status: DISCONTINUED | OUTPATIENT
Start: 2021-02-05 | End: 2021-02-07 | Stop reason: HOSPADM

## 2021-02-05 RX ADMIN — ENOXAPARIN SODIUM 40 MG: 40 INJECTION SUBCUTANEOUS at 21:06

## 2021-02-05 RX ADMIN — Medication 10 ML: at 14:59

## 2021-02-05 RX ADMIN — ENOXAPARIN SODIUM 40 MG: 40 INJECTION SUBCUTANEOUS at 10:16

## 2021-02-05 RX ADMIN — INSULIN LISPRO 2 UNITS: 100 INJECTION, SOLUTION INTRAVENOUS; SUBCUTANEOUS at 12:37

## 2021-02-05 RX ADMIN — INSULIN LISPRO 6 UNITS: 100 INJECTION, SOLUTION INTRAVENOUS; SUBCUTANEOUS at 21:05

## 2021-02-05 RX ADMIN — DEXAMETHASONE SODIUM PHOSPHATE 4 MG: 4 INJECTION, SOLUTION INTRA-ARTICULAR; INTRALESIONAL; INTRAMUSCULAR; INTRAVENOUS; SOFT TISSUE at 10:16

## 2021-02-05 RX ADMIN — CHOLECALCIFEROL TAB 25 MCG (1000 UNIT) 2000 UNITS: 25 TAB at 10:16

## 2021-02-05 RX ADMIN — INSULIN LISPRO 4 UNITS: 100 INJECTION, SOLUTION INTRAVENOUS; SUBCUTANEOUS at 18:28

## 2021-02-05 RX ADMIN — SILODOSIN 8 MG: 4 CAPSULE ORAL at 21:07

## 2021-02-05 RX ADMIN — Medication 1 CAPSULE: at 10:16

## 2021-02-05 RX ADMIN — Medication 10 ML: at 21:07

## 2021-02-05 RX ADMIN — OXYCODONE HYDROCHLORIDE AND ACETAMINOPHEN 1000 MG: 500 TABLET ORAL at 10:16

## 2021-02-05 RX ADMIN — INSULIN LISPRO 2 UNITS: 100 INJECTION, SOLUTION INTRAVENOUS; SUBCUTANEOUS at 07:30

## 2021-02-05 RX ADMIN — REMDESIVIR 100 MG: 100 INJECTION, POWDER, LYOPHILIZED, FOR SOLUTION INTRAVENOUS at 21:06

## 2021-02-05 RX ADMIN — AZITHROMYCIN DIHYDRATE 500 MG: 500 INJECTION, POWDER, LYOPHILIZED, FOR SOLUTION INTRAVENOUS at 22:06

## 2021-02-05 RX ADMIN — AZITHROMYCIN DIHYDRATE 500 MG: 500 INJECTION, POWDER, LYOPHILIZED, FOR SOLUTION INTRAVENOUS at 01:00

## 2021-02-05 RX ADMIN — METOPROLOL TARTRATE 25 MG: 25 TABLET, FILM COATED ORAL at 12:37

## 2021-02-05 RX ADMIN — ASPIRIN 81 MG: 81 TABLET, CHEWABLE ORAL at 10:15

## 2021-02-05 RX ADMIN — Medication 10 ML: at 05:39

## 2021-02-05 RX ADMIN — SILODOSIN 8 MG: 4 CAPSULE ORAL at 01:00

## 2021-02-05 RX ADMIN — DEXAMETHASONE 4 MG: 4 TABLET ORAL at 21:07

## 2021-02-05 NOTE — PROGRESS NOTES
Hospitalist Progress Note    Subjective:   Daily Progress Note: 2/5/2021 6:22 PM    Hospital Course:  Bubba Loomis a 77 y. o. male with past medical history of hypertension and coronary artery disease presenting to the ER with complaints of shortness of breath, cough, confusion, fatigue and generalized weakness.  Shortness of breath and cough have been present for the past 2 weeks.  Patient was found to be positive for COVID-19 3 days ago. Yolanda Vidal denies recent fever, chills, nausea, vomiting, chest pain, palpitations, diarrhea or abdominal pain.  This evening, emergency medical services were called for increased work of breathing, confusion and worsening shortness of breath.  On arrival to patient's home, EMS found patient to have oxygen saturations of 74% on room air.      Subjective: Pt seen in room, sitting in chair, breathing is better, still on O2 at 3 L NC,  Has mild SOB on exertion    Current Facility-Administered Medications   Medication Dose Route Frequency    metoprolol tartrate (LOPRESSOR) tablet 25 mg  25 mg Oral Q12H    dexAMETHasone (DECADRON) tablet 4 mg  4 mg Oral Q12H    insulin lispro (HUMALOG) injection   SubCUTAneous AC&HS    glucose chewable tablet 16 g  4 Tab Oral PRN    glucagon (GLUCAGEN) injection 1 mg  1 mg IntraMUSCular PRN    dextrose (D50W) injection syrg 12.5-25 g  25-50 mL IntraVENous PRN    metoprolol (LOPRESSOR) injection 2.5 mg  2.5 mg IntraVENous Q6H PRN    enoxaparin (LOVENOX) injection 40 mg  40 mg SubCUTAneous Q12H    silodosin (RAPAFLO) capsule 8 mg (Patient Supplied)  8 mg Oral QHS    dextromethorphan (DELSYM) 30 mg/5 mL syrup 30 mg  30 mg Oral BID PRN    aspirin chewable tablet 81 mg  81 mg Oral DAILY    cholecalciferol (VITAMIN D3) (1000 Units /25 mcg) tablet 2,000 Units  2,000 Units Oral DAILY    ascorbic acid (vitamin C) (VITAMIN C) tablet 1,000 mg  1,000 mg Oral DAILY    zinc sulfate (ZINCATE) 220 (50) mg capsule 1 Cap  1 Cap Oral DAILY    azithromycin (ZITHROMAX) 500 mg in 0.9% sodium chloride 250 mL (VIAL-MATE)  500 mg IntraVENous Q24H    sodium chloride (NS) flush 5-40 mL  5-40 mL IntraVENous Q8H    sodium chloride (NS) flush 5-40 mL  5-40 mL IntraVENous PRN    acetaminophen (TYLENOL) tablet 650 mg  650 mg Oral Q6H PRN    Or    acetaminophen (TYLENOL) suppository 650 mg  650 mg Rectal Q6H PRN    polyethylene glycol (MIRALAX) packet 17 g  17 g Oral DAILY PRN    promethazine (PHENERGAN) tablet 12.5 mg  12.5 mg Oral Q6H PRN    Or    ondansetron (ZOFRAN) injection 4 mg  4 mg IntraVENous Q6H PRN    remdesivir 100 mg in 0.9% sodium chloride 250 mL IVPB  100 mg IntraVENous Q24H        Review of Systems:    Review of Systems   Constitutional: Negative for chills and fever. HENT: Negative for congestion and sore throat. Respiratory: Positive for shortness of breath. Negative for cough. Cardiovascular: Negative for chest pain and palpitations. Gastrointestinal: Negative for heartburn and nausea. Genitourinary: Negative for dysuria and urgency. Neurological: Negative for dizziness and headaches. Objective:     Visit Vitals  /84 (BP 1 Location: Right upper arm) Comment: Edited   Pulse 84   Temp 98 °F (36.7 °C)   Resp 18   Ht 5' 9\" (1.753 m)   Wt 99.8 kg (220 lb)   SpO2 93%   BMI 32.49 kg/m²    O2 Flow Rate (L/min): 4 l/min O2 Device: Nasal cannula    Temp (24hrs), Av.9 °F (36.6 °C), Min:97.5 °F (36.4 °C), Max:98.2 °F (36.8 °C)      No intake/output data recorded.  1901 -  0700  In: 742 [P.O.:180; I.V.:562]  Out: 1400 [Urine:1400]    PHYSICAL EXAM:    Physical Exam     Constitutional: He is oriented to person, place, and time. He appears well-developed. No distress. Neck: Normal range of motion. Neck supple. Cardiovascular: Normal rate, regular rhythm, normal heart sounds and intact distal pulses. Pulmonary/Chest: Effort normal and breath sounds normal.   Musculoskeletal: Normal range of motion. Neurological: He is alert and oriented to person, place, and time. Skin: Skin is warm and dry. Psychiatric: He has a normal mood and affect. His behavior is normal  Data Review    Recent Results (from the past 24 hour(s))   GLUCOSE, POC    Collection Time: 02/04/21  9:02 PM   Result Value Ref Range    Glucose (POC) 142 (H) 65 - 100 mg/dL    Performed by Louis Butler, POC    Collection Time: 02/05/21  7:21 AM   Result Value Ref Range    Glucose (POC) 155 (H) 65 - 100 mg/dL    Performed by Corazon Arriaza    CBC WITH AUTOMATED DIFF    Collection Time: 02/05/21  9:40 AM   Result Value Ref Range    WBC 14.8 (H) 4.1 - 11.1 K/uL    RBC 4.85 4.10 - 5.70 M/uL    HGB 14.8 12.1 - 17.0 g/dL    HCT 44.6 36.6 - 50.3 %    MCV 92.0 80.0 - 99.0 FL    MCH 30.5 26.0 - 34.0 PG    MCHC 33.2 30.0 - 36.5 g/dL    RDW 14.0 11.5 - 14.5 %    PLATELET 755 531 - 493 K/uL    MPV 9.8 8.9 - 12.9 FL    NEUTROPHILS 90 (H) 32 - 75 %    LYMPHOCYTES 6 (L) 12 - 49 %    MONOCYTES 4 (L) 5 - 13 %    EOSINOPHILS 0 0 - 7 %    BASOPHILS 0 0 - 1 %    IMMATURE GRANULOCYTES 0 0.0 - 0.5 %    ABS. NEUTROPHILS 13.3 (H) 1.8 - 8.0 K/UL    ABS. LYMPHOCYTES 0.9 0.8 - 3.5 K/UL    ABS. MONOCYTES 0.6 0.0 - 1.0 K/UL    ABS. EOSINOPHILS 0.0 0.0 - 0.4 K/UL    ABS. BASOPHILS 0.0 0.0 - 0.1 K/UL    ABS. IMM. GRANS. 0.1 (H) 0.00 - 0.04 K/UL    DF AUTOMATED     C REACTIVE PROTEIN, QT    Collection Time: 02/05/21  9:40 AM   Result Value Ref Range    C-Reactive protein 1.48 (H) 0.00 - 0.60 mg/dL   GLUCOSE, POC    Collection Time: 02/05/21 11:28 AM   Result Value Ref Range    Glucose (POC) 186 (H) 65 - 100 mg/dL    Performed by Corazon Arriaza    GLUCOSE, POC    Collection Time: 02/05/21  5:15 PM   Result Value Ref Range    Glucose (POC) 221 (H) 65 - 100 mg/dL    Performed by Corazon Arriaza        XR CHEST SNGL V   Final Result   Findings/impression:      Multifocal patchy interstitial airspace disease. No pleural effusion or   pneumothorax.       Cardiomediastinal contours are within normal limits.      No acute osseous abnormality identified.      XR CHEST PORT    (Results Pending)       Active Problems:    Pneumonia (2/1/2021)      Acute respiratory failure with hypoxia (HCC) (2/1/2021)      COVID-19 (2/1/2021)        Assessment/Plan:   1. Acute respiratory failure with hypoxia - secondary to covid 19 pneumonitis- complete remdesivir today,  continue  IV steroids, azithromycin CXR showing patchy interstitial airspace disease.  Weaned to nasal cannula, on 3 L , keep O2 saturation above 92%.   Pulmonary following.      2. HX of Hypertension- BP acceptable range, hold medications     3. Generalized weakness- OOB to chair, incentive spirometry, OT/PT consults              DVT Prophylaxis: lovenox  Code Status:  Full Code  POA: GOSIA Vidal, son, [phone]    Care Plan discussed with:   ___patient, staff nurse____________________________________________________________    Valeriy Morton NP

## 2021-02-05 NOTE — PROGRESS NOTES
Bedside shift change report given to ADEOLA Weiner RN (oncoming nurse) by CLEMENCIA Driscoll RN(offgoing nurse). Report included the following information SBAR, Kardex, Intake/Output, MAR and Recent Results.

## 2021-02-05 NOTE — PROGRESS NOTES
Dr. Evelin Mccoy notified patient is on 8 mg silodosin at bedtime. Patient's VS are heart rate 56 with ranges of 52-58 today and blood pressure 122/77. Clarification needed if medication okay to give. Orders received to give medication as scheduled.

## 2021-02-05 NOTE — CONSULTS
Consult  Pulmonary, Critical Care    Name: Jose Armando Duong MRN: [id number]   : 1945 Hospital: [address] [place] [address]   Date: 2021  Admission date: 2021 Hospital Day: 5       Subjective/Interval History:   Seen on the medical floor for acute hypoxic respiratory failure with COVID-19 testing positive and chest x-ray consistent with Covid pneumonitis. Patient has had cough and shortness of breath for about 2 weeks. Does not know where he was exposed to COVID-19. He does not look distressed but is on a 100% nonrebreather mask with oxygen saturation 96%. He has BPH and is on Rapaflo which is not carried by the hospital and he is worried about being off of it as well as several of his other medications  2/4 markedly improved have been switched to nasal oxygen at 6 L he states his breathing feels comfortable with no cough  2/5 respirations nonlabored he states he feels fine appetite and sense of taste are normal.  He denies any cough today  Hospital Problems  Date Reviewed: 2021          Codes Class Noted POA    Pneumonia ICD-10-CM: J18.9  ICD-9-CM: 832  2021 Yes        Acute respiratory failure with hypoxia ([address] ICD-10-CM: J96.01  ICD-9-CM: 518.81  2021 Yes        COVID-19 ICD-10-CM: U07.1  ICD-9-CM: 079.89  2021 Yes              IMPRESSION:   1. Acute hypoxic respiratory failure improving continue to taper oxygen currently at 4 L  2. COVID-19 pneumonitis  3. COVID-19 infection  4. Prostate hypertrophy  5. Hypertension  6. Coronary artery disease  7. Reportedly chronic herpetic skin infection  Body mass index is 32.49 kg/m². 8.       RECOMMENDATIONS/PLAN:   1. Tolerating nasal oxygen today will taper per protocol and check overnight oximetry  2. Therapy has decreased will decrease Decadron to oral dosing  3. Has received 2 doses Actemra and receives last dose Remdesivir today  4. Continue self incentive spirometry hourly  5.  Currently holding Norvasc Lopressor and irbesartan due to his current blood pressure blood pressure up today will resume Lopressor  6. [x] High complexity decision making was performed  [x] See my orders for details      Subjective/Initial History:     I was asked by Danelle Scherer MD to see Zaina Strauss  a 77 y.o.    male in consultation for a chief complaint of acute hypoxic respiratory failure with COVID-19 infection    No Known Allergies     MAR reviewed and pertinent medications noted or modified as needed   Home medications include:  Acyclovir 400 twice daily  Norvasc 5 mg daily  Irbesartan 300 mg daily  Lopressor 25 mg a.m. and 50 mg nightly  Silodosin 8 mg daily  Aspirin 81 daily  Current Facility-Administered Medications   Medication    dexamethasone (DECADRON) 4 mg/mL injection 4 mg    insulin lispro (HUMALOG) injection    glucose chewable tablet 16 g    glucagon (GLUCAGEN) injection 1 mg    dextrose (D50W) injection syrg 12.5-25 g    metoprolol (LOPRESSOR) injection 2.5 mg    enoxaparin (LOVENOX) injection 40 mg    silodosin (RAPAFLO) capsule 8 mg (Patient Supplied)    dextromethorphan (DELSYM) 30 mg/5 mL syrup 30 mg    aspirin chewable tablet 81 mg    cholecalciferol (VITAMIN D3) (1000 Units /25 mcg) tablet 2,000 Units    ascorbic acid (vitamin C) (VITAMIN C) tablet 1,000 mg    zinc sulfate (ZINCATE) 220 (50) mg capsule 1 Cap    azithromycin (ZITHROMAX) 500 mg in 0.9% sodium chloride 250 mL (VIAL-MATE)    sodium chloride (NS) flush 5-40 mL    sodium chloride (NS) flush 5-40 mL    acetaminophen (TYLENOL) tablet 650 mg    Or    acetaminophen (TYLENOL) suppository 650 mg    polyethylene glycol (MIRALAX) packet 17 g    promethazine (PHENERGAN) tablet 12.5 mg    Or    ondansetron (ZOFRAN) injection 4 mg    remdesivir 100 mg in 0.9% sodium chloride 250 mL IVPB      Patient PCP: Yasir Odonnell MD  PMH:  has a past medical history of Bladder cancer ([facility] [address] CAD (coronary artery disease), HSV infection, and Hypertension. PSH:   has a past surgical history that includes hx urological and pr cardiac surg procedure unlist.   FHX: family history includes Coronary Artery Disease in an other family member; Hypertension in his father. SHX:  reports that he has quit smoking. He has never used smokeless tobacco. He reports previous alcohol use. He reports that he does not use drugs. Smoked from his early teenage years up until the age of 62 about a pack a day     Systemic review:  General weight has been stable with no chronic fever chills or sweats. He has had cough for 2 weeks and loss of taste  Eyes no double vision or momentary blindness  ENT smell seems intact no sinus congestion or drainage  Endocrinologic no polyuria polydipsia  Musculoskeletal no swollen tender joints  Neurologic no seizures or syncope  Gastrointestinal poor appetite no nausea or vomiting sense of taste does not seem quite right to him  Genitourinary has prostate hypertrophy is on Xeloda 7 4 prostate hypertrophy.   Has good flow as long as he takes it  Cardiovascular has a history of heart disease is followed up in [place] does not have any recent chest pain diaphoresis ankle edema or nocturia  Respiratory cough mostly nonproductive for the last week gradually worsening with shortness of breath    Objective:     Vital Signs: Telemetry:    normal sinus rhythm Intake/Output:   Visit Vitals  /84 (BP 1 Location: Right upper arm) Comment: Edited   Pulse 84   Temp 98 °F (36.7 °C)   Resp 18   Ht 5' 9\" (1.753 m)   Wt 99.8 kg (220 lb)   SpO2 94%   BMI 32.49 kg/m²       Temp (24hrs), Av.9 °F (36.6 °C), Min:97.5 °F (36.4 °C), Max:98.2 °F (36.8 °C)        O2 Device: Nasal cannula O2 Flow Rate (L/min): 4 l/min       Wt Readings from Last 4 Encounters:   21 99.8 kg (220 lb)          Intake/Output Summary (Last 24 hours) at 2021 1029  Last data filed at 2021 0539  Gross per 24 hour   Intake 742 ml   Output 1400 ml   Net -658 ml       Last shift:      No intake/output data recorded. Last 3 shifts: 02/03 1901 - 02/05 0700  In: 742 [P.O.:180; I.V.:562]  Out: 1400 [Urine:1400]       Physical Exam:   General:  male; sitting on the side of the bed in no distress on 4 L nasal oxygen  HEENT: NCAT, oral mucosa normal  Eyes: anicteric; conjunctiva clear extraocular movements intact  Neck: no nodes, no definite JVD but obesity obscures the exam no accessory MM use. Chest: no deformity,  Cardiac: Regular rate and rhythm no murmur no edema  Lungs: Clear anteriorly and laterally and posteriorly  Abd: Soft nontender normal bowel sounds  Ext: no edema; no joint swelling; No clubbing  :clear urine  Neuro: Awake alert oriented speech is clear moves all 4 extremities  Psych- no agitation, oriented to person;   Skin: warm, dry, no cyanosis;  Pulses: Brachial radial femoral pulses intact  Capillary: Normal capillary refill    Labs:    Recent Labs     02/05/21  0940 02/03/21  1135   WBC 14.8* 14.9*   HGB 14.8 14.9    336     Recent Labs     02/03/21  1135   *   K 3.8      CO2 23   *   BUN 27*   CREA 1.04   CA 8.7   4 L nasal oxygen with oxygen saturation 96%    Ferritin 735  CRP 5.58, 10.5  COVID-19 antigen positive  Lab Results   Component Value Date/Time    Culture result:  02/01/2021 07:50 PM     Gram Positive Rods  CALLED TO AND READ BACK BY  ONESIMO MORALES AT 7666 BY JF  Single bottle drawn has been flagged positive by instumentation. Bottle has been sent to [address] [place] [address] Laboratory for determination of possible growth. Culture result:  02/01/2021 07:50 PM     Bacillus species, not anthracis GROWING IN THE AEROBIC BOTTLE     Imaging:    CXR Results  (Last 48 hours)    None        Results from [place] encounter on 02/01/21   XR CHEST SNGL V    Narrative Study: XR CHEST SNGL V    Clinical indication: COVID +    Comparison: None. Impression Findings/impression:    Multifocal patchy interstitial airspace disease.  No pleural effusion or  pneumothorax. Cardiomediastinal contours are within normal limits. No acute osseous abnormality identified. · Discussion COVID-19 pneumonitis with acute hypoxic respiratory failure currently on 100% nonrebreather. Have encouraged him to do self incentive spirometry and continue to monitor oxygen. He has been started on Remdesivir and I will add Actemra 2 doses and adjust the Decadron dose  · 2/4 markedly improved feels good today. Will start decreasing Decadron continue Remdesivir  · 2/5 continues to improve oxygen down to 4 L receives last dose Remdesivir today. Will switch to oral Decadron repeat labs and chest x-ray tomorrow.   Consider discharge tomorrow or 2/7    Jovan Conley MD

## 2021-02-06 ENCOUNTER — APPOINTMENT (OUTPATIENT)
Dept: GENERAL RADIOLOGY | Age: 76
DRG: 177 | End: 2021-02-06
Attending: INTERNAL MEDICINE
Payer: MEDICARE

## 2021-02-06 LAB
BASOPHILS # BLD: 0 K/UL (ref 0–0.1)
BASOPHILS NFR BLD: 0 % (ref 0–1)
CRP SERPL-MCNC: 1 MG/DL (ref 0–0.6)
DIFFERENTIAL METHOD BLD: ABNORMAL
EOSINOPHIL # BLD: 0 K/UL (ref 0–0.4)
EOSINOPHIL NFR BLD: 0 % (ref 0–7)
ERYTHROCYTE [DISTWIDTH] IN BLOOD BY AUTOMATED COUNT: 14.2 % (ref 11.5–14.5)
GLUCOSE BLD STRIP.AUTO-MCNC: 139 MG/DL (ref 65–100)
GLUCOSE BLD STRIP.AUTO-MCNC: 170 MG/DL (ref 65–100)
GLUCOSE BLD STRIP.AUTO-MCNC: 191 MG/DL (ref 65–100)
GLUCOSE BLD STRIP.AUTO-MCNC: 204 MG/DL (ref 65–100)
HCT VFR BLD AUTO: 43.8 % (ref 36.6–50.3)
HGB BLD-MCNC: 14.5 G/DL (ref 12.1–17)
IMM GRANULOCYTES # BLD AUTO: 0.1 K/UL (ref 0–0.04)
IMM GRANULOCYTES NFR BLD AUTO: 0 % (ref 0–0.5)
LDH SERPL L TO P-CCNC: 332 U/L (ref 85–241)
LYMPHOCYTES # BLD: 0.7 K/UL (ref 0.8–3.5)
LYMPHOCYTES NFR BLD: 5 % (ref 12–49)
MCH RBC QN AUTO: 31 PG (ref 26–34)
MCHC RBC AUTO-ENTMCNC: 33.1 G/DL (ref 30–36.5)
MCV RBC AUTO: 93.6 FL (ref 80–99)
MONOCYTES # BLD: 1 K/UL (ref 0–1)
MONOCYTES NFR BLD: 7 % (ref 5–13)
NEUTS SEG # BLD: 13.2 K/UL (ref 1.8–8)
NEUTS SEG NFR BLD: 88 % (ref 32–75)
PERFORMED BY, TECHID: ABNORMAL
PLATELET # BLD AUTO: 426 K/UL (ref 150–400)
PMV BLD AUTO: 9.8 FL (ref 8.9–12.9)
RBC # BLD AUTO: 4.68 M/UL (ref 4.1–5.7)
WBC # BLD AUTO: 15 K/UL (ref 4.1–11.1)

## 2021-02-06 PROCEDURE — 74011250637 HC RX REV CODE- 250/637: Performed by: FAMILY MEDICINE

## 2021-02-06 PROCEDURE — 83615 LACTATE (LD) (LDH) ENZYME: CPT

## 2021-02-06 PROCEDURE — 74011250637 HC RX REV CODE- 250/637: Performed by: INTERNAL MEDICINE

## 2021-02-06 PROCEDURE — 94762 N-INVAS EAR/PLS OXIMTRY CONT: CPT

## 2021-02-06 PROCEDURE — 36415 COLL VENOUS BLD VENIPUNCTURE: CPT

## 2021-02-06 PROCEDURE — 85025 COMPLETE CBC W/AUTO DIFF WBC: CPT

## 2021-02-06 PROCEDURE — 74011636637 HC RX REV CODE- 636/637: Performed by: NURSE PRACTITIONER

## 2021-02-06 PROCEDURE — 71045 X-RAY EXAM CHEST 1 VIEW: CPT

## 2021-02-06 PROCEDURE — 74011250636 HC RX REV CODE- 250/636: Performed by: NURSE PRACTITIONER

## 2021-02-06 PROCEDURE — 74011250636 HC RX REV CODE- 250/636: Performed by: INTERNAL MEDICINE

## 2021-02-06 PROCEDURE — 82962 GLUCOSE BLOOD TEST: CPT

## 2021-02-06 PROCEDURE — 86140 C-REACTIVE PROTEIN: CPT

## 2021-02-06 PROCEDURE — 65270000029 HC RM PRIVATE

## 2021-02-06 RX ORDER — DEXAMETHASONE 4 MG/1
4 TABLET ORAL DAILY
Status: DISCONTINUED | OUTPATIENT
Start: 2021-02-07 | End: 2021-02-07 | Stop reason: HOSPADM

## 2021-02-06 RX ADMIN — INSULIN LISPRO 4 UNITS: 100 INJECTION, SOLUTION INTRAVENOUS; SUBCUTANEOUS at 16:30

## 2021-02-06 RX ADMIN — Medication 10 ML: at 05:39

## 2021-02-06 RX ADMIN — OXYCODONE HYDROCHLORIDE AND ACETAMINOPHEN 1000 MG: 500 TABLET ORAL at 09:58

## 2021-02-06 RX ADMIN — Medication 10 ML: at 21:50

## 2021-02-06 RX ADMIN — ENOXAPARIN SODIUM 40 MG: 40 INJECTION SUBCUTANEOUS at 21:51

## 2021-02-06 RX ADMIN — Medication 10 ML: at 16:13

## 2021-02-06 RX ADMIN — DEXAMETHASONE 4 MG: 4 TABLET ORAL at 09:58

## 2021-02-06 RX ADMIN — INSULIN LISPRO 2 UNITS: 100 INJECTION, SOLUTION INTRAVENOUS; SUBCUTANEOUS at 12:23

## 2021-02-06 RX ADMIN — ASPIRIN 81 MG: 81 TABLET, CHEWABLE ORAL at 09:58

## 2021-02-06 RX ADMIN — AZITHROMYCIN DIHYDRATE 500 MG: 500 INJECTION, POWDER, LYOPHILIZED, FOR SOLUTION INTRAVENOUS at 21:49

## 2021-02-06 RX ADMIN — SILODOSIN 8 MG: 4 CAPSULE ORAL at 21:49

## 2021-02-06 RX ADMIN — METOPROLOL TARTRATE 25 MG: 25 TABLET, FILM COATED ORAL at 09:58

## 2021-02-06 RX ADMIN — CHOLECALCIFEROL TAB 25 MCG (1000 UNIT) 2000 UNITS: 25 TAB at 09:58

## 2021-02-06 RX ADMIN — Medication 1 CAPSULE: at 09:58

## 2021-02-06 RX ADMIN — INSULIN LISPRO 2 UNITS: 100 INJECTION, SOLUTION INTRAVENOUS; SUBCUTANEOUS at 22:00

## 2021-02-06 NOTE — PROGRESS NOTES
Hospitalist Progress Note    Subjective:   Daily Progress Note: 2/6/2021 2:17 PM    Hospital Course:  Ramo Lomas a 77 y. o. male with past medical history of hypertension and coronary artery disease presenting to the ER with complaints of shortness of breath, cough, confusion, fatigue and generalized weakness.  Shortness of breath and cough have been present for the past 2 weeks.  Patient was found to be positive for COVID-19 3 days ago. Marco Watson. Young denies recent fever, chills, nausea, vomiting, chest pain, palpitations, diarrhea or abdominal pain.  This evening, emergency medical services were called for increased work of breathing, confusion and worsening shortness of breath.  On arrival to patient's home, EMS found patient to have oxygen saturations of 74% on room. Subjective: Pt seen in room, sitting in chair, on room air, states feels much better   Less cough and no dyspnea reported.      Current Facility-Administered Medications   Medication Dose Route Frequency    [START ON 2/7/2021] dexAMETHasone (DECADRON) tablet 4 mg  4 mg Oral DAILY    metoprolol tartrate (LOPRESSOR) tablet 25 mg  25 mg Oral Q12H    azithromycin (ZITHROMAX) 500 mg in 0.9% sodium chloride 250 mL (VIAL-MATE)  500 mg IntraVENous Q24H    insulin lispro (HUMALOG) injection   SubCUTAneous AC&HS    glucose chewable tablet 16 g  4 Tab Oral PRN    glucagon (GLUCAGEN) injection 1 mg  1 mg IntraMUSCular PRN    dextrose (D50W) injection syrg 12.5-25 g  25-50 mL IntraVENous PRN    metoprolol (LOPRESSOR) injection 2.5 mg  2.5 mg IntraVENous Q6H PRN    enoxaparin (LOVENOX) injection 40 mg  40 mg SubCUTAneous Q12H    silodosin (RAPAFLO) capsule 8 mg (Patient Supplied)  8 mg Oral QHS    dextromethorphan (DELSYM) 30 mg/5 mL syrup 30 mg  30 mg Oral BID PRN    aspirin chewable tablet 81 mg  81 mg Oral DAILY    cholecalciferol (VITAMIN D3) (1000 Units /25 mcg) tablet 2,000 Units  2,000 Units Oral DAILY    ascorbic acid (vitamin C) (VITAMIN C) tablet 1,000 mg  1,000 mg Oral DAILY    zinc sulfate (ZINCATE) 220 (50) mg capsule 1 Cap  1 Cap Oral DAILY    sodium chloride (NS) flush 5-40 mL  5-40 mL IntraVENous Q8H    sodium chloride (NS) flush 5-40 mL  5-40 mL IntraVENous PRN    acetaminophen (TYLENOL) tablet 650 mg  650 mg Oral Q6H PRN    Or    acetaminophen (TYLENOL) suppository 650 mg  650 mg Rectal Q6H PRN    polyethylene glycol (MIRALAX) packet 17 g  17 g Oral DAILY PRN    promethazine (PHENERGAN) tablet 12.5 mg  12.5 mg Oral Q6H PRN    Or    ondansetron (ZOFRAN) injection 4 mg  4 mg IntraVENous Q6H PRN        Review of Systems:    Review of Systems   Constitutional: Negative for chills and fever. HENT: Negative for congestion and sore throat. Respiratory: Negative for cough. Cardiovascular: Negative for chest pain and palpitations. Gastrointestinal: Negative for heartburn, nausea and vomiting. Genitourinary: Negative for dysuria and urgency. Neurological: Negative for dizziness and headaches. Objective:     Visit Vitals  BP (!) 141/72 (BP 1 Location: Left upper arm, BP Patient Position: At rest)   Pulse 72   Temp 97.6 °F (36.4 °C)   Resp 18   Ht 5' 9\" (1.753 m)   Wt 99.8 kg (220 lb)   SpO2 93%   BMI 32.49 kg/m²    O2 Flow Rate (L/min): 4 l/min O2 Device: Room air    Temp (24hrs), Av.7 °F (36.5 °C), Min:97.3 °F (36.3 °C), Max:98.1 °F (36.7 °C)      No intake/output data recorded.  1901 -  0700  In: 7332 [P.O.:360; I.V.:1062]  Out: 2700 [Urine:2700]    PHYSICAL EXAM:    Physical Exam   Constitutional: He is oriented to person, place, and time. He appears well-developed. No distress. Neck: Normal range of motion. Neck supple. Cardiovascular: Normal rate, regular rhythm, normal heart sounds and intact distal pulses. Pulmonary/Chest: Effort normal and breath sounds normal.   Abdominal: Soft. Musculoskeletal: Normal range of motion.    Neurological: He is alert and oriented to person, place, and time.   Skin: Skin is warm and dry. Psychiatric: He has a normal mood and affect. Data Review    Recent Results (from the past 24 hour(s))   GLUCOSE, POC    Collection Time: 02/05/21  5:15 PM   Result Value Ref Range    Glucose (POC) 221 (H) 65 - 100 mg/dL    Performed by Corazon Arriaza    GLUCOSE, POC    Collection Time: 02/05/21  7:18 PM   Result Value Ref Range    Glucose (POC) 261 (H) 65 - 100 mg/dL    Performed by [address] [place] [address] , POC    Collection Time: 02/06/21  7:43 AM   Result Value Ref Range    Glucose (POC) 139 (H) 65 - 100 mg/dL    Performed by [place]    GLUCOSE, POC    Collection Time: 02/06/21 11:42 AM   Result Value Ref Range    Glucose (POC) 170 (H) 65 - 100 mg/dL    Performed by [facility]    CBC WITH AUTOMATED DIFF    Collection Time: 02/06/21 11:50 AM   Result Value Ref Range    WBC 15.0 (H) 4.1 - 11.1 K/uL    RBC 4.68 4.10 - 5.70 M/uL    HGB 14.5 12.1 - 17.0 g/dL    HCT 43.8 36.6 - 50.3 %    MCV 93.6 80.0 - 99.0 FL    MCH 31.0 26.0 - 34.0 PG    MCHC 33.1 30.0 - 36.5 g/dL    RDW 14.2 11.5 - 14.5 %    PLATELET 925 (H) 007 - 400 K/uL    MPV 9.8 8.9 - 12.9 FL    NEUTROPHILS 88 (H) 32 - 75 %    LYMPHOCYTES 5 (L) 12 - 49 %    MONOCYTES 7 5 - 13 %    EOSINOPHILS 0 0 - 7 %    BASOPHILS 0 0 - 1 %    IMMATURE GRANULOCYTES 0 0.0 - 0.5 %    ABS. NEUTROPHILS 13.2 (H) 1.8 - 8.0 K/UL    ABS. LYMPHOCYTES 0.7 (L) 0.8 - 3.5 K/UL    ABS. MONOCYTES 1.0 0.0 - 1.0 K/UL    ABS. EOSINOPHILS 0.0 0.0 - 0.4 K/UL    ABS. BASOPHILS 0.0 0.0 - 0.1 K/UL    ABS. IMM.  GRANS. 0.1 (H) 0.00 - 0.04 K/UL    DF AUTOMATED     C REACTIVE PROTEIN, QT    Collection Time: 02/06/21 11:50 AM   Result Value Ref Range    C-Reactive protein 1.00 (H) 0.00 - 0.60 mg/dL   LD    Collection Time: 02/06/21 11:50 AM   Result Value Ref Range     (H) 85 - 241 U/L       XR CHEST PORT   Final Result   No significant change compared to the prior study            XR CHEST SNGL V   Final Result   Findings/impression: Multifocal patchy interstitial airspace disease. No pleural effusion or   pneumothorax. Cardiomediastinal contours are within normal limits. No acute osseous abnormality identified. Active Problems:    Pneumonia (2/1/2021)      Acute respiratory failure with hypoxia ([address] (2/1/2021)      COVID-19 (2/1/2021)        Assessment/Plan:   1. Acute respiratory failure with hypoxia - secondary to covid 19 pneumonitis-  Resolving, on room air, last dose of remdesivir today, decadron to PO ,   Overnight split study  did not show O2 desaturation, repeat tonight, CM to arrange home O2 if needed. CXR in am, pulmonary following. 2. HX of Hypertension- BP acceptable range, hold medications     3. Generalized weakness- OOB to chair, incentive spirometry, OT/PT consults    4.  Discharge- possible tomorrow,         DVT Prophylaxis: lovenox  Code Status:  Full Code  POA: Adamaris Slade, son, [phone] discussed with:   _____patient, staff nurse__________________________________________________________    Tierra Douglass NP

## 2021-02-06 NOTE — PROGRESS NOTES
CM received a phone call from Dr. Ewa Montoya regarding patient needing home 02 2L for noctural use ordered. CM spoke to patient via phone and got a choice for DME; CM sent referral to [facility]. CM spoke to Pam Patel from respiratory about seeing patient for home 02 order. Pam Patel stated that patient will be evaluated by respiratory this evening. CM will continue to follow patient.

## 2021-02-06 NOTE — CONSULTS
Consult  Pulmonary, Critical Care    Name: Claudean Darby MRN: [id number]   : 1945 Hospital: [facility]   Date: 2021  Admission date: 2021 Hospital Day: 6       Subjective/Interval History:   Seen on the medical floor for acute hypoxic respiratory failure with COVID-19 testing positive and chest x-ray consistent with Covid pneumonitis. Patient has had cough and shortness of breath for about 2 weeks. Does not know where he was exposed to COVID-19. He does not look distressed but is on a 100% nonrebreather mask with oxygen saturation 96%. He has BPH and is on Rapaflo which is not carried by the hospital and he is worried about being off of it as well as several of his other medications  2/4 markedly improved have been switched to nasal oxygen at 6 L he states his breathing feels comfortable with no cough  2/6 respirations nonlabored he states he feels fine appetite and sense of taste are normal.  He denies any cough. Room air oxygen saturation 92%. Overnight oximetry did show low oxygen saturation 75% with 1 hour 5 minutes below 89%   Hospital Problems  Date Reviewed: 2021          Codes Class Noted POA    Pneumonia ICD-10-CM: J18.9  ICD-9-CM: 216  2021 Yes        Acute respiratory failure with hypoxia [facility]) ICD-10-CM: J96.01  ICD-9-CM: 518.81  2021 Yes        [id number] ICD-10-CM: U07.1  ICD-9-CM: 079.89  2021 Yes              IMPRESSION:   1. Acute hypoxic respiratory failure improving we will leave on room air today and repeat  overnight oximetry tonight  2. COVID-19 pneumonitis  3. COVID-19 infection  4. Prostate hypertrophy  5. Hypertension  6. Coronary artery disease  7. Reportedly chronic herpetic skin infection  Body mass index is 32.49 kg/m². 8.       RECOMMENDATIONS/PLAN:   1. Tolerating room air oxygen at rest today  2. Continue to decrease Decadron would plan discharge tomorrow on 4 mg daily for another week  3.  Has received 2 doses Actemra and received last dose Remdesivir 2/5  4. Continue self incentive spirometry hourly  5. Currently holding Norvasc and irbesartan Lopressor resumed 2/5  6. [x] High complexity decision making was performed  [x] See my orders for details      Subjective/Initial History:     I was asked by Marybeth Washington MD to see Ran Dickerson  a 77 y.o.    male in consultation for a chief complaint of acute hypoxic respiratory failure with COVID-19 infection    No Known Allergies     MAR reviewed and pertinent medications noted or modified as needed   Home medications include:  Acyclovir 400 twice daily  Norvasc 5 mg daily  Irbesartan 300 mg daily  Lopressor 25 mg a.m. and 50 mg nightly  Silodosin 8 mg daily  Aspirin 81 daily  Current Facility-Administered Medications   Medication    metoprolol tartrate (LOPRESSOR) tablet 25 mg    dexAMETHasone (DECADRON) tablet 4 mg    azithromycin (ZITHROMAX) 500 mg in 0.9% sodium chloride 250 mL (VIAL-MATE)    insulin lispro (HUMALOG) injection    glucose chewable tablet 16 g    glucagon (GLUCAGEN) injection 1 mg    dextrose (D50W) injection syrg 12.5-25 g    metoprolol (LOPRESSOR) injection 2.5 mg    enoxaparin (LOVENOX) injection 40 mg    silodosin (RAPAFLO) capsule 8 mg (Patient Supplied)    dextromethorphan (DELSYM) 30 mg/5 mL syrup 30 mg    aspirin chewable tablet 81 mg    cholecalciferol (VITAMIN D3) (1000 Units /25 mcg) tablet 2,000 Units    ascorbic acid (vitamin C) (VITAMIN C) tablet 1,000 mg    zinc sulfate (ZINCATE) 220 (50) mg capsule 1 Cap    sodium chloride (NS) flush 5-40 mL    sodium chloride (NS) flush 5-40 mL    acetaminophen (TYLENOL) tablet 650 mg    Or    acetaminophen (TYLENOL) suppository 650 mg    polyethylene glycol (MIRALAX) packet 17 g    promethazine (PHENERGAN) tablet 12.5 mg    Or    ondansetron (ZOFRAN) injection 4 mg      Patient PCP: Blas, MD Yasir  PMH:  has a past medical history of Bladder cancer ([facility] [address] CAD (coronary artery disease), HSV infection, and Hypertension. PSH:   has a past surgical history that includes hx urological and pr cardiac surg procedure unlist.   FHX: family history includes Coronary Artery Disease in an other family member; Hypertension in his father. SHX:  reports that he has quit smoking. He has never used smokeless tobacco. He reports previous alcohol use. He reports that he does not use drugs. Smoked from his early teenage years up until the age of 62 about a pack a day     Systemic review:  General weight has been stable with no chronic fever chills or sweats. He has had cough for 2 weeks and loss of taste  Eyes no double vision or momentary blindness  ENT smell seems intact no sinus congestion or drainage  Endocrinologic no polyuria polydipsia  Musculoskeletal no swollen tender joints  Neurologic no seizures or syncope  Gastrointestinal poor appetite no nausea or vomiting sense of taste does not seem quite right to him  Genitourinary has prostate hypertrophy is on Xeloda 7 4 prostate hypertrophy.   Has good flow as long as he takes it  Cardiovascular has a history of heart disease is followed up in [place] does not have any recent chest pain diaphoresis ankle edema or nocturia  Respiratory cough mostly nonproductive for the last week gradually worsening with shortness of breath    Objective:     Vital Signs: Telemetry:    normal sinus rhythm Intake/Output:   Visit Vitals  BP (!) 141/72 (BP 1 Location: Left upper arm, BP Patient Position: At rest)   Pulse 72   Temp 97.6 °F (36.4 °C)   Resp 18   Ht 5' 9\" (1.753 m)   Wt 99.8 kg (220 lb)   SpO2 93%   BMI 32.49 kg/m²       Temp (24hrs), Av.7 °F (36.5 °C), Min:97.3 °F (36.3 °C), Max:98.1 °F (36.7 °C)        O2 Device: Room air O2 Flow Rate (L/min): 4 l/min       Wt Readings from Last 4 Encounters:   21 99.8 kg (220 lb)          Intake/Output Summary (Last 24 hours) at 2021 1321  Last data filed at 2021 0603  Gross per 24 hour   Intake 680 ml   Output 1300 ml   Net -620 ml       Last shift:      No intake/output data recorded. Last 3 shifts: 02/04 1901 - 02/06 0700  In: 5244 [P.O.:360; I.V.:1062]  Out: 2700 [Urine:2700]       Physical Exam:   General:  male; sitting on the side of the bed in no distress on 4 L nasal oxygen  HEENT: NCAT, oral mucosa normal  Eyes: anicteric; conjunctiva clear extraocular movements intact  Neck: no nodes, no definite JVD but obesity obscures the exam no accessory MM use. Chest: no deformity,  Cardiac: Regular rate and rhythm no murmur no edema  Lungs: Clear anteriorly and laterally and posteriorly  Abd: Soft nontender normal bowel sounds  Ext: no edema; no joint swelling; No clubbing  :clear urine  Neuro: Awake alert oriented speech is clear moves all 4 extremities  Psych- no agitation, oriented to person;   Skin: warm, dry, no cyanosis;  Pulses: Brachial radial femoral pulses intact  Capillary: Normal capillary refill    Labs:    Recent Labs     02/05/21  0940   WBC 14.8*   HGB 14.8        4 L nasal oxygen with oxygen saturation 96%    Ferritin 735  CRP 5.58, 10.5  COVID-19 antigen positive  Lab Results   Component Value Date/Time    Culture result:  02/01/2021 07:50 PM     Gram Positive Rods  CALLED TO AND READ BACK BY  ONESIMO MORALES AT 3771 BY JF  Single bottle drawn has been flagged positive by instumentation. Bottle has been sent to [facility] for determination of possible growth. Culture result:  02/01/2021 07:50 PM     Bacillus species, not anthracis GROWING IN THE AEROBIC BOTTLE     Imaging:    CXR Results  (Last 48 hours)               02/06/21 0905  XR CHEST PORT Final result    Impression:  No significant change compared to the prior study               Narrative:  History: Pneumonia       Single view of the chest was provided. Heart size is within normal limits. Mild   prominence of the interstitium is present.  There is improved aeration compared   to the prior study no focal consolidation is identified. No effusions or   pneumothorax. Bony structures show some degenerative changes. Results from Hospital Encounter encounter on 02/01/21   XR CHEST PORT    Narrative History: Pneumonia    Single view of the chest was provided. Heart size is within normal limits. Mild  prominence of the interstitium is present. There is improved aeration compared  to the prior study no focal consolidation is identified. No effusions or  pneumothorax. Bony structures show some degenerative changes. Impression No significant change compared to the prior study       XR CHEST SNGL V    Narrative Study: XR CHEST SNGL V    Clinical indication: COVID +    Comparison: None. Impression Findings/impression:    Multifocal patchy interstitial airspace disease. No pleural effusion or  pneumothorax. Cardiomediastinal contours are within normal limits. No acute osseous abnormality identified. · Discussion COVID-19 pneumonitis with acute hypoxic respiratory failure currently on 100% nonrebreather. Have encouraged him to do self incentive spirometry and continue to monitor oxygen. He has been started on Remdesivir and I will add Actemra 2 doses and adjust the Decadron dose  · 2/4 markedly improved feels good today. Will start decreasing Decadron continue Remdesivir  · 2/5 continues to improve oxygen down to 4 L receives last dose Remdesivir today. Will switch to oral Decadron repeat labs and chest x-ray tomorrow. · 2/6 overnight oximetry does show oxygen desaturation while sleeping today awake oxygen saturation 90 to 93%. We will leave on room air plan discharge tomorrow on Decadron 4 mg daily for 1 week.   We will ask case management to set up oxygen for nocturnal use and if his overnight tonight is adequate we will cancel it    Kellee Palacio MD

## 2021-02-07 ENCOUNTER — APPOINTMENT (OUTPATIENT)
Dept: GENERAL RADIOLOGY | Age: 76
DRG: 177 | End: 2021-02-07
Attending: NURSE PRACTITIONER
Payer: MEDICARE

## 2021-02-07 VITALS
WEIGHT: 220 LBS | HEIGHT: 69 IN | RESPIRATION RATE: 20 BRPM | SYSTOLIC BLOOD PRESSURE: 138 MMHG | HEART RATE: 53 BPM | DIASTOLIC BLOOD PRESSURE: 77 MMHG | BODY MASS INDEX: 32.58 KG/M2 | OXYGEN SATURATION: 90 % | TEMPERATURE: 97.5 F

## 2021-02-07 LAB
GLUCOSE BLD STRIP.AUTO-MCNC: 112 MG/DL (ref 65–100)
PERFORMED BY, TECHID: ABNORMAL

## 2021-02-07 PROCEDURE — 74011250637 HC RX REV CODE- 250/637: Performed by: FAMILY MEDICINE

## 2021-02-07 PROCEDURE — 74011250637 HC RX REV CODE- 250/637: Performed by: INTERNAL MEDICINE

## 2021-02-07 PROCEDURE — 74011250636 HC RX REV CODE- 250/636: Performed by: INTERNAL MEDICINE

## 2021-02-07 PROCEDURE — 82962 GLUCOSE BLOOD TEST: CPT

## 2021-02-07 PROCEDURE — 71045 X-RAY EXAM CHEST 1 VIEW: CPT

## 2021-02-07 RX ORDER — VITAMIN E 1000 UNIT
1000 CAPSULE ORAL DAILY
Qty: 30 TAB | Refills: 0 | Status: SHIPPED | OUTPATIENT
Start: 2021-02-08 | End: 2021-03-10

## 2021-02-07 RX ORDER — SILODOSIN 8 MG/1
8 CAPSULE ORAL
Qty: 30 CAP | Refills: 0 | Status: SHIPPED
Start: 2021-02-07 | End: 2021-03-09

## 2021-02-07 RX ORDER — DEXAMETHASONE 4 MG/1
4 TABLET ORAL
Qty: 7 TAB | Refills: 0 | Status: SHIPPED | OUTPATIENT
Start: 2021-02-07 | End: 2021-02-14

## 2021-02-07 RX ORDER — MELATONIN
2000 DAILY
Qty: 60 TAB | Refills: 0 | Status: SHIPPED | OUTPATIENT
Start: 2021-02-08 | End: 2021-03-10

## 2021-02-07 RX ORDER — ZINC SULFATE 50(220)MG
220 CAPSULE ORAL DAILY
Qty: 30 CAP | Refills: 0 | Status: SHIPPED | OUTPATIENT
Start: 2021-02-08 | End: 2021-03-10

## 2021-02-07 RX ORDER — ACETAMINOPHEN 325 MG/1
650 TABLET ORAL
Qty: 180 TAB | Refills: 0 | Status: SHIPPED
Start: 2021-02-07 | End: 2021-03-09

## 2021-02-07 RX ADMIN — Medication 10 ML: at 06:42

## 2021-02-07 RX ADMIN — Medication 1 CAPSULE: at 10:34

## 2021-02-07 RX ADMIN — CHOLECALCIFEROL TAB 25 MCG (1000 UNIT) 2000 UNITS: 25 TAB at 10:34

## 2021-02-07 RX ADMIN — ASPIRIN 81 MG: 81 TABLET, CHEWABLE ORAL at 10:34

## 2021-02-07 RX ADMIN — OXYCODONE HYDROCHLORIDE AND ACETAMINOPHEN 1000 MG: 500 TABLET ORAL at 10:34

## 2021-02-07 RX ADMIN — METOPROLOL TARTRATE 25 MG: 25 TABLET, FILM COATED ORAL at 10:34

## 2021-02-07 RX ADMIN — DEXAMETHASONE 4 MG: 4 TABLET ORAL at 10:34

## 2021-02-07 NOTE — DISCHARGE SUMMARY
Hospitalist Discharge Summary     Patient ID:    Ran Dickerson  [id number]  27 y.o.  1945    Admit date: 2/1/2021    Discharge date : 2/7/2021    Chronic Diagnoses:    Problem List as of 2/7/2021 Date Reviewed: 2/1/2021          Codes Class Noted - Resolved    Pneumonia ICD-10-CM: J18.9  ICD-9-CM: 408  2/1/2021 - Present        Acute respiratory failure with hypoxia ([facility] [address] ICD-10-CM: J96.01  ICD-9-CM: 518.81  2/1/2021 - Present        COVID-19 ICD-10-CM: U07.1  ICD-9-CM: 079.89  2/1/2021 - Present          22    Final Diagnoses: Active Problems:    Pneumonia (2/1/2021)      Acute respiratory failure with hypoxia ([address] (2/1/2021)      COVID-19 (2/1/2021)        Reason for Hospitalization:  Ran Dickerson is a 77 y.o. male with past medical history of hypertension and coronary artery disease presenting to the ER with complaints of shortness of breath, cough, confusion, fatigue and generalized weakness. Shortness of breath and cough have been present for the past 2 weeks. Patient was found to be positive for COVID-19 3 days ago. Mr. Sloan Vila denies recent fever, chills, nausea, vomiting, chest pain, palpitations, diarrhea or abdominal pain. This evening, emergency medical services were called for increased work of breathing, confusion and worsening shortness of breath. On arrival to patient's home, EMS found patient to have oxygen saturations of 74% on room air. Mr. Sloan Vila was placed on a NRB and transported to the ER for further treatment and evaluation. Hospital Course:   Pt admitted for covid 19 infection, was started on IV antibiotics, received 5 doses of remdesivir and actrema x 2 doses, and required supplemental oxygen. Pt significantly improved over hospitalization course, however, will still require nocturnal oxygen upon discharge. Pt to continue with oral steroids for 7 more days, and continue with other home medications as prescribed. Follow up with PCP in one month. Discharge Medications:   Current Discharge Medication List      START taking these medications    Details   acetaminophen (TYLENOL) 325 mg tablet Take 2 Tabs by mouth every four (4) hours as needed for Pain or Fever for up to 30 days. Qty: 180 Tab, Refills: 0      ascorbic acid, vitamin C, (VITAMIN C) 1,000 mg tablet Take 1 Tab by mouth daily for 30 days. Qty: 30 Tab, Refills: 0      cholecalciferol (VITAMIN D3) (1000 Units /25 mcg) tablet Take 2 Tabs by mouth daily for 30 days. Qty: 60 Tab, Refills: 0      dexAMETHasone (DECADRON) 4 mg tablet Take 4 mg by mouth Daily (before breakfast) for 7 days. Qty: 7 Tab, Refills: 0      silodosin (RAPAFLO) 8 mg capsule Take 1 Cap by mouth nightly for 30 days. Qty: 30 Cap, Refills: 0      zinc sulfate (ZINCATE) 220 (50) mg capsule Take 1 Cap by mouth daily for 30 days. Qty: 30 Cap, Refills: 0         CONTINUE these medications which have NOT CHANGED    Details   irbesartan (AVAPRO) 300 mg tablet Take 300 mg by mouth daily. aspirin 81 mg chewable tablet Take 81 mg by mouth daily. metoprolol tartrate (LOPRESSOR) 25 mg tablet Take 25 mg by mouth two (2) times a day. amLODIPine (Norvasc) 5 mg tablet Take 5 mg by mouth daily. STOP taking these medications       acyclovir (ZOVIRAX) 400 mg tablet Comments:   Reason for Stopping: Follow up Care:    1. Yasir Odonnell MD in 1 month. Follow-up Information     Follow up With Specialties Details Why Contact Info      In 1 month      Cathy Pedraza MD Internal Medicine In 1 month  [address] [facility] [address] [facility] [address] [facility]  [phone]              Patient Follow Up Instructions:    Activity: Activity as tolerated  Diet:  Regular Diet    Condition at Discharge:  Stable  __________________________________________________________________    Disposition  Home or Self Care  ____________________________________________________________________    Code Status:  Full Code  ___________________________________________________________________    Discharge Exam:  Patient seen and examined by me on discharge day. Physical Exam   Constitutional: He is oriented to person, place, and time. He appears well-developed. No distress. Neck: Normal range of motion. Neck supple. Cardiovascular: Normal rate, regular rhythm, normal heart sounds and intact distal pulses. Pulmonary/Chest: Effort normal and breath sounds normal.   Abdominal: Soft. Bowel sounds are normal.   Musculoskeletal: Normal range of motion. Neurological: He is alert and oriented to person, place, and time. Skin: Skin is warm and dry. Psychiatric: He has a normal mood and affect. His behavior is normal.        CONSULTATIONS: Pulmonary/Intensive care    Significant Diagnostic Studies:   Recent Results (from the past 24 hour(s))   GLUCOSE, POC    Collection Time: 02/06/21 11:42 AM   Result Value Ref Range    Glucose (POC) 170 (H) 65 - 100 mg/dL    Performed by Sherley Campos    CBC WITH AUTOMATED DIFF    Collection Time: 02/06/21 11:50 AM   Result Value Ref Range    WBC 15.0 (H) 4.1 - 11.1 K/uL    RBC 4.68 4.10 - 5.70 M/uL    HGB 14.5 12.1 - 17.0 g/dL    HCT 43.8 36.6 - 50.3 %    MCV 93.6 80.0 - 99.0 FL    MCH 31.0 26.0 - 34.0 PG    MCHC 33.1 30.0 - 36.5 g/dL    RDW 14.2 11.5 - 14.5 %    PLATELET 425 (H) 917 - 400 K/uL    MPV 9.8 8.9 - 12.9 FL    NEUTROPHILS 88 (H) 32 - 75 %    LYMPHOCYTES 5 (L) 12 - 49 %    MONOCYTES 7 5 - 13 %    EOSINOPHILS 0 0 - 7 %    BASOPHILS 0 0 - 1 %    IMMATURE GRANULOCYTES 0 0.0 - 0.5 %    ABS. NEUTROPHILS 13.2 (H) 1.8 - 8.0 K/UL    ABS. LYMPHOCYTES 0.7 (L) 0.8 - 3.5 K/UL    ABS. MONOCYTES 1.0 0.0 - 1.0 K/UL    ABS. EOSINOPHILS 0.0 0.0 - 0.4 K/UL    ABS. BASOPHILS 0.0 0.0 - 0.1 K/UL    ABS. IMM.  GRANS. 0.1 (H) 0.00 - 0.04 K/UL    DF AUTOMATED     C REACTIVE PROTEIN, QT    Collection Time: 02/06/21 11:50 AM   Result Value Ref Range    C-Reactive protein 1.00 (H) 0.00 - 0.60 mg/dL   LD Collection Time: 02/06/21 11:50 AM   Result Value Ref Range     (H) 85 - 241 U/L   GLUCOSE, POC    Collection Time: 02/06/21  5:03 PM   Result Value Ref Range    Glucose (POC) 204 (H) 65 - 100 mg/dL    Performed by Corazon Arriaza    GLUCOSE, POC    Collection Time: 02/06/21  7:15 PM   Result Value Ref Range    Glucose (POC) 191 (H) 65 - 100 mg/dL    Performed by Bhumika Reynoso    GLUCOSE, POC    Collection Time: 02/07/21  9:12 AM   Result Value Ref Range    Glucose (POC) 112 (H) 65 - 100 mg/dL    Performed by BRIJESH GAMINO      XR CHEST PORT   Final Result   No significant change compared to the prior study            XR CHEST SNGL V   Final Result   Findings/impression:      Multifocal patchy interstitial airspace disease. No pleural effusion or   pneumothorax. Cardiomediastinal contours are within normal limits. No acute osseous abnormality identified. XR CHEST PORT    (Results Pending)       Discharge: time spent 35 minutes in discharge  Education and counseling.      Signed:  Fernando Laureano NP  2/7/2021  11:11 AM

## 2021-02-07 NOTE — DISCHARGE INSTRUCTIONS
Patient Education        Learning About COVID-19 and Flu Symptoms  How can you tell COVID-19 from the flu? COVID-19 and the flu have similar symptoms. The two can be hard to tell apart. The only way to know for sure which illness you have is to be tested. Since the symptoms are so alike, it makes sense to act as if you have COVID-19 until your test results come back. This means staying home and limiting contact with people in your home. You'll need to wash your hands often and disinfect surfaces that you touch. And be sure to wear a mask or face covering when you're around other people. This is also good advice if you think you have the flu. COVID-19 and the flu have these symptoms in common:  · Fever or chills  · Cough  · Shortness of breath  · Fatigue (tiredness)  · Sore throat  · Runny or stuffy nose  · Muscle pain or body aches  · Headache  · Vomiting and diarrhea (more common in children than adults)  COVID-19 has another symptom that also may occur:  · New loss of taste or smell  COVID-19 symptoms may appear from 2 to 14 days after infection. Flu symptoms usually appear 1 to 4 days after infection. Why should you get a flu shot during the COVID-19 pandemic? It's important to get your yearly flu vaccine. Both the flu and COVID-19 are expected to be active during flu season. You can get sick with both infections at once. And having both may make you more sick than getting just one. The flu vaccine won't protect you from COVID-19. But it can help prevent the flu or reduce its symptoms. If fewer people get very ill with the flu, this will help free up medical resources that are needed for COVID-19 patients. Where can you learn more? Go to http://www.Whittl.com/  Enter C123 in the search box to learn more about \"Learning About COVID-19 and Flu Symptoms. \"  Current as of: December 18, 2020               Content Version: 12.7  © 7858-1883 [facility].    Care instructions adapted under license by [employer] (which disclaims liability or warranty for this information). If you have questions about a medical condition or this instruction, always ask your healthcare professional. [address] any warranty or liability for your use of this information. Patient Education        Learning About the COVID-19 Vaccine  Overview     The COVID-19 vaccine can help you avoid getting COVID-19, a disease caused by a new type of coronavirus. COVID-19 can cause pneumonia and even death. You may need two doses of the vaccine. And you might need \"booster\" doses later on to help you stay protected. The vaccine prevents most cases of COVID-19. But if you do still catch COVID-19, your symptoms will probably be less severe than if you hadn't gotten the vaccine. You can't get COVID-19 from the vaccine. The risk of serious problems from the vaccine is very low. And you might not have any side effects from the vaccine at all. If you do, they will probably be a lot like the common side effects of other vaccines. They include things like a slight fever, muscle aches, and soreness. These side effects don't last too long, and they can be treated if they bother you. Why is it a good idea to get the COVID-19 vaccine? The COVID-19 vaccine is one of the best ways to help stop the pandemic. Getting vaccinated as soon as you can will help protect you from the virus. It will also help you protect people around you from the virus--people who could really be hurt. The COVID-19 vaccine is safe and effective. In fact, the risk of serious problems from COVID-19 is much higher than the risk of serious problems from the vaccine. So it's safer to get the vaccine than it is to catch COVID-19. Who should get the COVID-19 vaccine? Everyone who is able to get the vaccine should get it as soon as possible.  The more people who get vaccinated, the better we'll be able to stop the spread of the virus. The vaccine is extra important for people who are at high risk. This includes people who may be exposed to COVID-19 more often because of their jobs. It also includes people who are at high risk for complications from [id number]-59 if they catch it. Some examples of people at high risk include those who:  · Work in health care. · Are considered essential workers. · Have certain health conditions. · Are older than age 72. If you've already had COVID-19, you may still be able to catch it again. Getting the vaccine may provide extra protection. Where can you learn more? Go to http://www.gray.com/  Enter C124 in the search box to learn more about \"Learning About the COVID-19 Vaccine. \"  Current as of: December 18, 2020               Content Version: 12.7  © 5514-3858 [employer]. Care instructions adapted under license by [employer] (which disclaims liability or warranty for this information). If you have questions about a medical condition or this instruction, always ask your healthcare professional. [address] any warranty or liability for your use of this information.

## 2021-02-07 NOTE — PROGRESS NOTES
CM spoke to [facility] regarding patient being discharged with 02 noctural.    SANDRA had Dr. Humera Arnold sign DME Equipment order.

## 2022-03-12 ENCOUNTER — HOSPITAL ENCOUNTER (EMERGENCY)
Age: 77
Discharge: HOME | End: 2022-03-12
Attending: EMERGENCY MEDICINE
Payer: MEDICARE

## 2022-03-12 ENCOUNTER — APPOINTMENT (OUTPATIENT)
Dept: GENERAL RADIOLOGY | Age: 77
End: 2022-03-12
Attending: EMERGENCY MEDICINE
Payer: MEDICARE

## 2022-03-12 VITALS
DIASTOLIC BLOOD PRESSURE: 88 MMHG | SYSTOLIC BLOOD PRESSURE: 130 MMHG | OXYGEN SATURATION: 92 % | HEART RATE: 62 BPM | TEMPERATURE: 97.9 F | WEIGHT: 230 LBS | RESPIRATION RATE: 20 BRPM | HEIGHT: 70 IN | BODY MASS INDEX: 32.93 KG/M2

## 2022-03-12 DIAGNOSIS — R07.9 CHEST PAIN, UNSPECIFIED TYPE: Primary | ICD-10-CM

## 2022-03-12 LAB
ALBUMIN SERPL-MCNC: 3.8 G/DL (ref 3.5–5)
ALBUMIN/GLOB SERPL: 1.1 {RATIO} (ref 1.1–2.2)
ALP SERPL-CCNC: 78 U/L (ref 45–117)
ALT SERPL-CCNC: 68 U/L (ref 12–78)
ANION GAP SERPL CALC-SCNC: 6 MMOL/L (ref 5–15)
AST SERPL W P-5'-P-CCNC: 39 U/L (ref 15–37)
ATRIAL RATE: 73 BPM
BASOPHILS # BLD: 0.1 K/UL (ref 0–0.1)
BASOPHILS NFR BLD: 1 % (ref 0–1)
BILIRUB SERPL-MCNC: 0.4 MG/DL (ref 0.2–1)
BNP SERPL-MCNC: 23 PG/ML
BUN SERPL-MCNC: 15 MG/DL (ref 6–20)
BUN/CREAT SERPL: 16 (ref 12–20)
CA-I BLD-MCNC: 9.2 MG/DL (ref 8.5–10.1)
CALCULATED P AXIS, ECG09: 45 DEGREES
CALCULATED R AXIS, ECG10: 37 DEGREES
CALCULATED T AXIS, ECG11: 62 DEGREES
CHLORIDE SERPL-SCNC: 105 MMOL/L (ref 97–108)
CO2 SERPL-SCNC: 26 MMOL/L (ref 21–32)
CREAT SERPL-MCNC: 0.92 MG/DL (ref 0.7–1.3)
DIAGNOSIS, 93000: NORMAL
DIFFERENTIAL METHOD BLD: ABNORMAL
EOSINOPHIL # BLD: 0.2 K/UL (ref 0–0.4)
EOSINOPHIL NFR BLD: 2 % (ref 0–7)
ERYTHROCYTE [DISTWIDTH] IN BLOOD BY AUTOMATED COUNT: 13 % (ref 11.5–14.5)
GLOBULIN SER CALC-MCNC: 3.6 G/DL (ref 2–4)
GLUCOSE SERPL-MCNC: 168 MG/DL (ref 65–100)
HCT VFR BLD AUTO: 46.4 % (ref 36.6–50.3)
HGB BLD-MCNC: 15.5 G/DL (ref 12.1–17)
IMM GRANULOCYTES # BLD AUTO: 0 K/UL (ref 0–0.04)
IMM GRANULOCYTES NFR BLD AUTO: 0 % (ref 0–0.5)
LYMPHOCYTES # BLD: 4.1 K/UL (ref 0.8–3.5)
LYMPHOCYTES NFR BLD: 43 % (ref 12–49)
MCH RBC QN AUTO: 30.5 PG (ref 26–34)
MCHC RBC AUTO-ENTMCNC: 33.4 G/DL (ref 30–36.5)
MCV RBC AUTO: 91.3 FL (ref 80–99)
MONOCYTES # BLD: 1 K/UL (ref 0–1)
MONOCYTES NFR BLD: 11 % (ref 5–13)
NEUTS SEG # BLD: 4.2 K/UL (ref 1.8–8)
NEUTS SEG NFR BLD: 43 % (ref 32–75)
NRBC # BLD: 0 K/UL (ref 0–0.01)
NRBC BLD-RTO: 0 PER 100 WBC
P-R INTERVAL, ECG05: 172 MS
PLATELET # BLD AUTO: 229 K/UL (ref 150–400)
PMV BLD AUTO: 9.2 FL (ref 8.9–12.9)
POTASSIUM SERPL-SCNC: 4.5 MMOL/L (ref 3.5–5.1)
PROT SERPL-MCNC: 7.4 G/DL (ref 6.4–8.2)
Q-T INTERVAL, ECG07: 396 MS
QRS DURATION, ECG06: 90 MS
QTC CALCULATION (BEZET), ECG08: 436 MS
RBC # BLD AUTO: 5.08 M/UL (ref 4.1–5.7)
SODIUM SERPL-SCNC: 137 MMOL/L (ref 136–145)
TROPONIN-HIGH SENSITIVITY: 6 NG/L (ref 0–76)
TROPONIN-HIGH SENSITIVITY: 7 NG/L (ref 0–76)
VENTRICULAR RATE, ECG03: 73 BPM
WBC # BLD AUTO: 9.5 K/UL (ref 4.1–11.1)

## 2022-03-12 PROCEDURE — 80053 COMPREHEN METABOLIC PANEL: CPT

## 2022-03-12 PROCEDURE — 93005 ELECTROCARDIOGRAM TRACING: CPT

## 2022-03-12 PROCEDURE — 36415 COLL VENOUS BLD VENIPUNCTURE: CPT

## 2022-03-12 PROCEDURE — 85025 COMPLETE CBC W/AUTO DIFF WBC: CPT

## 2022-03-12 PROCEDURE — 71045 X-RAY EXAM CHEST 1 VIEW: CPT

## 2022-03-12 PROCEDURE — 99285 EMERGENCY DEPT VISIT HI MDM: CPT

## 2022-03-12 PROCEDURE — 83880 ASSAY OF NATRIURETIC PEPTIDE: CPT

## 2022-03-12 PROCEDURE — 84484 ASSAY OF TROPONIN QUANT: CPT

## 2022-03-12 RX ORDER — SILODOSIN 8 MG/1
8 CAPSULE ORAL
COMMUNITY

## 2022-03-12 RX ORDER — GLUCOSAMINE SULFATE 1500 MG
POWDER IN PACKET (EA) ORAL DAILY
COMMUNITY

## 2022-03-12 RX ORDER — METFORMIN HYDROCHLORIDE 500 MG/1
500 TABLET ORAL 2 TIMES DAILY WITH MEALS
COMMUNITY

## 2022-03-12 RX ORDER — ASCORBIC ACID 500 MG
TABLET ORAL
COMMUNITY
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
## 2022-03-12 NOTE — ED NOTES
9388 - ED visit d/t CP - central CP with periods of radiation across chest / (L) arm - no active arm pain nor chest pain nor jaw pain;;    Had COVID 19 in March / April 2021    COVID 19 vaccine received; Hx of cardiac stent, x1 - placed \" >3 years ago \" - seen by Celso Michaud MD at in [facility])     Last cardiac cath competed \" >2 years ago \"     Pt doesn't recall when the last stress test was completed    Denies active CP / Dizziness / Light head / N / V / D / vision changes / weakness, focal;;    0229 Laly Lopez MD at bedside for eval;;   Pt's spouse at bedside for comfort and care at this time;;    5119 - Patient discharge by Nga Hernandez MD - pt sent to the front lobby, with strong and steady gait, no acute distress noted at time of discharge -  Discharge information / home RX / and reasons to return to the ED were reviewed by the doctor.       Pt's spouse at bedside for comfort, care and for a ride home;;

## 2022-03-12 NOTE — DISCHARGE INSTRUCTIONS
Thank you! Thank you for allowing me to care for you in the emergency department. I sincerely hope that you are satisfied with your visit today. It is my goal to provide you with excellent care. Below you will find a list of your labs and imaging from your visit today. Should you have any questions regarding these results please do not hesitate to call the emergency department. Labs -     Recent Results (from the past 12 hour(s))   CBC WITH AUTOMATED DIFF    Collection Time: 03/12/22  2:15 AM   Result Value Ref Range    WBC 9.5 4.1 - 11.1 K/uL    RBC 5.08 4.10 - 5.70 M/uL    HGB 15.5 12.1 - 17.0 g/dL    HCT 46.4 36.6 - 50.3 %    MCV 91.3 80.0 - 99.0 FL    MCH 30.5 26.0 - 34.0 PG    MCHC 33.4 30.0 - 36.5 g/dL    RDW 13.0 11.5 - 14.5 %    PLATELET 603 941 - 785 K/uL    MPV 9.2 8.9 - 12.9 FL    NRBC 0.0 0.0  WBC    ABSOLUTE NRBC 0.00 0.00 - 0.01 K/uL    NEUTROPHILS 43 32 - 75 %    LYMPHOCYTES 43 12 - 49 %    MONOCYTES 11 5 - 13 %    EOSINOPHILS 2 0 - 7 %    BASOPHILS 1 0 - 1 %    IMMATURE GRANULOCYTES 0 0 - 0.5 %    ABS. NEUTROPHILS 4.2 1.8 - 8.0 K/UL    ABS. LYMPHOCYTES 4.1 (H) 0.8 - 3.5 K/UL    ABS. MONOCYTES 1.0 0.0 - 1.0 K/UL    ABS. EOSINOPHILS 0.2 0.0 - 0.4 K/UL    ABS. BASOPHILS 0.1 0.0 - 0.1 K/UL    ABS. IMM. GRANS. 0.0 0.00 - 0.04 K/UL    DF AUTOMATED     METABOLIC PANEL, COMPREHENSIVE    Collection Time: 03/12/22  2:15 AM   Result Value Ref Range    Sodium 137 136 - 145 mmol/L    Potassium 4.5 3.5 - 5.1 mmol/L    Chloride 105 97 - 108 mmol/L    CO2 26 21 - 32 mmol/L    Anion gap 6 5 - 15 mmol/L    Glucose 168 (H) 65 - 100 mg/dL    BUN 15 6 - 20 mg/dL    Creatinine 0.92 0.70 - 1.30 mg/dL    BUN/Creatinine ratio 16 12 - 20      GFR est AA >60 >60 ml/min/1.73m2    GFR est non-AA >60 >60 ml/min/1.73m2    Calcium 9.2 8.5 - 10.1 mg/dL    Bilirubin, total 0.4 0.2 - 1.0 mg/dL    AST (SGOT) 39 (H) 15 - 37 U/L    ALT (SGPT) 68 12 - 78 U/L    Alk.  phosphatase 78 45 - 117 U/L    Protein, total 7.4 6.4 - 8.2 g/dL    Albumin 3.8 3.5 - 5.0 g/dL    Globulin 3.6 2.0 - 4.0 g/dL    A-G Ratio 1.1 1.1 - 2.2     TROPONIN-HIGH SENSITIVITY    Collection Time: 03/12/22  2:15 AM   Result Value Ref Range    Troponin-High Sensitivity 7 0 - 76 ng/L   NT-PRO BNP    Collection Time: 03/12/22  2:15 AM   Result Value Ref Range    NT pro-BNP 23 <450 pg/mL   TROPONIN-HIGH SENSITIVITY    Collection Time: 03/12/22  4:03 AM   Result Value Ref Range    Troponin-High Sensitivity 6 0 - 76 ng/L       Radiologic Studies -   XR CHEST PORT   Final Result   No demonstrated acute cardiopulmonary disease. CT Results  (Last 48 hours)      None          CXR Results  (Last 48 hours)                 03/12/22 0255  XR CHEST PORT Final result    Impression:  No demonstrated acute cardiopulmonary disease. Narrative:  Exam: AP chest       Comparison: 2/7/2021       Number of views: 1       Findings: The cardiac, mediastinal, and hilar shadows are within normal limits. The exam is negative for evidence of  pleural disease. The lungs are clear. If you feel that you have not received excellent quality care or timely care, please ask to speak to the nurse manager. Please choose us in the future for your continued health care needs. ------------------------------------------------------------------------------------------------------------  The exam and treatment you received in the Emergency Department were for an urgent problem and are not intended as complete care. It is important that you follow-up with a doctor, nurse practitioner, or physician assistant to:  (1) confirm your diagnosis,  (2) re-evaluation of changes in your illness and treatment, and  (3) for ongoing care. If your symptoms become worse or you do not improve as expected and you are unable to reach your usual health care provider, you should return to the Emergency Department. We are available 24 hours a day.      Please take your discharge instructions with you when you go to your follow-up appointment. If you have any problem arranging a follow-up appointment, contact the Emergency Department immediately. If a prescription has been provided, please have it filled as soon as possible to prevent a delay in treatment. Read the entire medication instruction sheet provided to you by the pharmacy. If you have any questions or reservations about taking the medication due to side effects or interactions with other medications, please call your primary care physician or contact the ER to speak with the charge nurse. Make an appointment with your family doctor or the physician you were referred to for follow-up of this visit as instructed on your discharge paperwork, as this is a mandatory follow-up. Return to the ER if you are unable to be seen or if you are unable to be seen in a timely manner. If you have any problem arranging the follow-up visit, contact the Emergency Department immediately.

## 2022-03-19 PROBLEM — U07.1 COVID-19: Status: ACTIVE | Noted: 2021-02-01

## 2022-03-19 PROBLEM — J18.9 PNEUMONIA: Status: ACTIVE | Noted: 2021-02-01

## 2022-03-20 PROBLEM — J96.01 ACUTE RESPIRATORY FAILURE WITH HYPOXIA (HCC): Status: ACTIVE | Noted: 2021-02-01

## 2023-05-16 RX ORDER — ASCORBIC ACID 500 MG
TABLET ORAL
COMMUNITY

## 2023-05-16 RX ORDER — ASPIRIN 81 MG/1
81 TABLET, CHEWABLE ORAL DAILY
COMMUNITY

## 2023-05-16 RX ORDER — AMLODIPINE BESYLATE 5 MG/1
5 TABLET ORAL DAILY
COMMUNITY

## 2023-05-16 RX ORDER — SILODOSIN 8 MG/1
8 CAPSULE ORAL
COMMUNITY

## 2023-05-16 RX ORDER — IRBESARTAN 300 MG/1
300 TABLET ORAL DAILY
COMMUNITY